# Patient Record
Sex: MALE | Race: WHITE | Employment: OTHER | URBAN - METROPOLITAN AREA
[De-identification: names, ages, dates, MRNs, and addresses within clinical notes are randomized per-mention and may not be internally consistent; named-entity substitution may affect disease eponyms.]

---

## 2017-08-14 ENCOUNTER — APPOINTMENT (OUTPATIENT)
Dept: GENERAL RADIOLOGY | Age: 82
DRG: 481 | End: 2017-08-14
Attending: NURSE PRACTITIONER
Payer: MEDICARE

## 2017-08-14 ENCOUNTER — APPOINTMENT (OUTPATIENT)
Dept: CT IMAGING | Age: 82
DRG: 481 | End: 2017-08-14
Attending: NURSE PRACTITIONER
Payer: MEDICARE

## 2017-08-14 ENCOUNTER — HOSPITAL ENCOUNTER (EMERGENCY)
Age: 82
Discharge: ANOTHER HOSPITAL | DRG: 481 | End: 2017-08-14
Attending: EMERGENCY MEDICINE
Payer: MEDICARE

## 2017-08-14 ENCOUNTER — ANESTHESIA EVENT (OUTPATIENT)
Dept: SURGERY | Age: 82
DRG: 481 | End: 2017-08-14
Payer: MEDICARE

## 2017-08-14 ENCOUNTER — HOSPITAL ENCOUNTER (INPATIENT)
Age: 82
LOS: 6 days | Discharge: NURSING, REHAB OR LONG-TERM CARE | DRG: 481 | End: 2017-08-20
Attending: INTERNAL MEDICINE | Admitting: HOSPITALIST
Payer: MEDICARE

## 2017-08-14 VITALS
WEIGHT: 160 LBS | BODY MASS INDEX: 25.11 KG/M2 | OXYGEN SATURATION: 95 % | TEMPERATURE: 97.9 F | HEIGHT: 67 IN | SYSTOLIC BLOOD PRESSURE: 123 MMHG | HEART RATE: 85 BPM | RESPIRATION RATE: 20 BRPM | DIASTOLIC BLOOD PRESSURE: 71 MMHG

## 2017-08-14 DIAGNOSIS — S72.144A CLOSED NONDISPLACED INTERTROCHANTERIC FRACTURE OF RIGHT FEMUR, INITIAL ENCOUNTER (HCC): ICD-10-CM

## 2017-08-14 DIAGNOSIS — S42.001A CLOSED NONDISPLACED FRACTURE OF RIGHT CLAVICLE, UNSPECIFIED PART OF CLAVICLE, INITIAL ENCOUNTER: ICD-10-CM
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
## 2017-08-14 PROBLEM — T14.8XXA FRACTURE: Status: ACTIVE | Noted: 2017-08-14

## 2017-08-14 PROBLEM — S72.309A FRACTURE CLOSED, FEMUR, SHAFT (HCC): Status: ACTIVE | Noted: 2017-08-14

## 2017-08-14 LAB
ALBUMIN SERPL BCP-MCNC: 3.2 G/DL (ref 3.2–4.6)
ALBUMIN/GLOB SERPL: 0.9 {RATIO} (ref 1.2–3.5)
ALP SERPL-CCNC: 97 U/L (ref 50–136)
ALT SERPL-CCNC: 14 U/L (ref 12–65)
ANION GAP BLD CALC-SCNC: 10 MMOL/L (ref 7–16)
AST SERPL W P-5'-P-CCNC: 20 U/L (ref 15–37)
ATRIAL RATE: 82 BPM
BACTERIA SPEC CULT: NORMAL
BASOPHILS # BLD AUTO: 0 K/UL (ref 0–0.2)
BASOPHILS # BLD: 0 % (ref 0–2)
BILIRUB SERPL-MCNC: 2.2 MG/DL (ref 0.2–1.1)
BUN SERPL-MCNC: 33 MG/DL (ref 8–23)
CALCIUM SERPL-MCNC: 9.2 MG/DL (ref 8.3–10.4)
CALCULATED P AXIS, ECG09: 67 DEGREES
CALCULATED R AXIS, ECG10: 34 DEGREES
CALCULATED T AXIS, ECG11: 90 DEGREES
CHLORIDE SERPL-SCNC: 103 MMOL/L (ref 98–107)
CK SERPL-CCNC: 24 U/L (ref 21–215)
CO2 SERPL-SCNC: 25 MMOL/L (ref 21–32)
CREAT SERPL-MCNC: 1.26 MG/DL (ref 0.8–1.5)
DIAGNOSIS, 93000: NORMAL
DIFFERENTIAL METHOD BLD: ABNORMAL
EOSINOPHIL # BLD: 0.1 K/UL (ref 0–0.8)
EOSINOPHIL NFR BLD: 4 % (ref 0.5–7.8)
ERYTHROCYTE [DISTWIDTH] IN BLOOD BY AUTOMATED COUNT: 15.1 % (ref 11.9–14.6)
GLOBULIN SER CALC-MCNC: 3.5 G/DL (ref 2.3–3.5)
GLUCOSE BLD STRIP.AUTO-MCNC: 147 MG/DL (ref 65–100)
GLUCOSE BLD STRIP.AUTO-MCNC: 189 MG/DL (ref 65–100)
GLUCOSE SERPL-MCNC: 137 MG/DL (ref 65–100)
HCT VFR BLD AUTO: 26.9 % (ref 41.1–50.3)
HGB BLD-MCNC: 9.1 G/DL (ref 13.6–17.2)
IMM GRANULOCYTES # BLD: 0 K/UL (ref 0–0.5)
IMM GRANULOCYTES NFR BLD AUTO: 0.3 % (ref 0–5)
LYMPHOCYTES # BLD AUTO: 14 % (ref 13–44)
LYMPHOCYTES # BLD: 0.5 K/UL (ref 0.5–4.6)
MCH RBC QN AUTO: 29.9 PG (ref 26.1–32.9)
MCHC RBC AUTO-ENTMCNC: 33.8 G/DL (ref 31.4–35)
MCV RBC AUTO: 88.5 FL (ref 79.6–97.8)
MONOCYTES # BLD: 0.3 K/UL (ref 0.1–1.3)
MONOCYTES NFR BLD AUTO: 7 % (ref 4–12)
NEUTS SEG # BLD: 2.7 K/UL (ref 1.7–8.2)
NEUTS SEG NFR BLD AUTO: 75 % (ref 43–78)
P-R INTERVAL, ECG05: 208 MS
PLATELET # BLD AUTO: 55 K/UL (ref 150–450)
PMV BLD AUTO: 11.7 FL (ref 10.8–14.1)
POTASSIUM SERPL-SCNC: 4.9 MMOL/L (ref 3.5–5.1)
PROT SERPL-MCNC: 6.7 G/DL (ref 6.3–8.2)
Q-T INTERVAL, ECG07: 426 MS
QRS DURATION, ECG06: 156 MS
QTC CALCULATION (BEZET), ECG08: 497 MS
RBC # BLD AUTO: 3.04 M/UL (ref 4.23–5.67)
SERVICE CMNT-IMP: NORMAL
SODIUM SERPL-SCNC: 138 MMOL/L (ref 136–145)
VENTRICULAR RATE, ECG03: 82 BPM
WBC # BLD AUTO: 3.6 K/UL (ref 4.3–11.1)

## 2017-08-14 PROCEDURE — 74011250636 HC RX REV CODE- 250/636: Performed by: NURSE PRACTITIONER

## 2017-08-14 PROCEDURE — 86920 COMPATIBILITY TEST SPIN: CPT | Performed by: HOSPITALIST

## 2017-08-14 PROCEDURE — 74011250636 HC RX REV CODE- 250/636: Performed by: HOSPITALIST

## 2017-08-14 PROCEDURE — 74011250637 HC RX REV CODE- 250/637: Performed by: HOSPITALIST

## 2017-08-14 PROCEDURE — 74011000258 HC RX REV CODE- 258: Performed by: HOSPITALIST

## 2017-08-14 PROCEDURE — C9113 INJ PANTOPRAZOLE SODIUM, VIA: HCPCS | Performed by: HOSPITALIST

## 2017-08-14 PROCEDURE — 65270000029 HC RM PRIVATE

## 2017-08-14 PROCEDURE — 86580 TB INTRADERMAL TEST: CPT | Performed by: HOSPITALIST

## 2017-08-14 PROCEDURE — 87641 MR-STAPH DNA AMP PROBE: CPT | Performed by: ORTHOPAEDIC SURGERY

## 2017-08-14 PROCEDURE — 86900 BLOOD TYPING SEROLOGIC ABO: CPT | Performed by: HOSPITALIST

## 2017-08-14 PROCEDURE — 74011000302 HC RX REV CODE- 302: Performed by: HOSPITALIST

## 2017-08-14 PROCEDURE — 82962 GLUCOSE BLOOD TEST: CPT

## 2017-08-14 PROCEDURE — 74011250637 HC RX REV CODE- 250/637: Performed by: ORTHOPAEDIC SURGERY

## 2017-08-14 PROCEDURE — 92610 EVALUATE SWALLOWING FUNCTION: CPT

## 2017-08-14 RX ORDER — INSULIN LISPRO 100 [IU]/ML
INJECTION, SOLUTION INTRAVENOUS; SUBCUTANEOUS
Status: DISCONTINUED | OUTPATIENT
Start: 2017-08-14 | End: 2017-08-20 | Stop reason: HOSPADM

## 2017-08-14 RX ORDER — LANOLIN ALCOHOL/MO/W.PET/CERES
1000 CREAM (GRAM) TOPICAL DAILY
Status: DISCONTINUED | OUTPATIENT
Start: 2017-08-15 | End: 2017-08-20 | Stop reason: HOSPADM

## 2017-08-14 RX ORDER — CALCIUM CARBONATE/VITAMIN D3 250-3.125
1 TABLET ORAL 2 TIMES DAILY WITH MEALS
Status: DISCONTINUED | OUTPATIENT
Start: 2017-08-14 | End: 2017-08-18 | Stop reason: SDUPTHER

## 2017-08-14 RX ORDER — LANOLIN ALCOHOL/MO/W.PET/CERES
1 CREAM (GRAM) TOPICAL 2 TIMES DAILY WITH MEALS
Status: CANCELLED | OUTPATIENT
Start: 2017-08-14

## 2017-08-14 RX ORDER — ALLOPURINOL 100 MG/1
100 TABLET ORAL DAILY
Status: DISCONTINUED | OUTPATIENT
Start: 2017-08-15 | End: 2017-08-20 | Stop reason: HOSPADM

## 2017-08-14 RX ORDER — ALPRAZOLAM 0.5 MG/1
2 TABLET ORAL 2 TIMES DAILY
Status: DISCONTINUED | OUTPATIENT
Start: 2017-08-14 | End: 2017-08-20 | Stop reason: HOSPADM

## 2017-08-14 RX ORDER — PANTOPRAZOLE SODIUM 40 MG/1
40 TABLET, DELAYED RELEASE ORAL
Status: CANCELLED | OUTPATIENT
Start: 2017-08-15

## 2017-08-14 RX ORDER — LANOLIN ALCOHOL/MO/W.PET/CERES
100 CREAM (GRAM) TOPICAL DAILY
Status: CANCELLED | OUTPATIENT
Start: 2017-08-15

## 2017-08-14 RX ORDER — OXYCODONE HYDROCHLORIDE 5 MG/1
5 TABLET ORAL
Status: DISCONTINUED | OUTPATIENT
Start: 2017-08-14 | End: 2017-08-20 | Stop reason: HOSPADM

## 2017-08-14 RX ORDER — TRAMADOL HYDROCHLORIDE 50 MG/1
50 TABLET ORAL
Status: DISCONTINUED | OUTPATIENT
Start: 2017-08-14 | End: 2017-08-20 | Stop reason: HOSPADM

## 2017-08-14 RX ORDER — SODIUM CHLORIDE, SODIUM LACTATE, POTASSIUM CHLORIDE, CALCIUM CHLORIDE 600; 310; 30; 20 MG/100ML; MG/100ML; MG/100ML; MG/100ML
75 INJECTION, SOLUTION INTRAVENOUS CONTINUOUS
Status: DISCONTINUED | OUTPATIENT
Start: 2017-08-14 | End: 2017-08-18

## 2017-08-14 RX ORDER — LANOLIN ALCOHOL/MO/W.PET/CERES
100 CREAM (GRAM) TOPICAL DAILY
Status: DISCONTINUED | OUTPATIENT
Start: 2017-08-15 | End: 2017-08-20 | Stop reason: HOSPADM

## 2017-08-14 RX ORDER — PANTOPRAZOLE SODIUM 40 MG/10ML
40 INJECTION, POWDER, LYOPHILIZED, FOR SOLUTION INTRAVENOUS EVERY 12 HOURS
Status: DISCONTINUED | OUTPATIENT
Start: 2017-08-14 | End: 2017-08-20 | Stop reason: HOSPADM

## 2017-08-14 RX ORDER — DEXTROSE MONOHYDRATE AND SODIUM CHLORIDE 5; .9 G/100ML; G/100ML
75 INJECTION, SOLUTION INTRAVENOUS CONTINUOUS
Status: DISCONTINUED | OUTPATIENT
Start: 2017-08-14 | End: 2017-08-20 | Stop reason: HOSPADM

## 2017-08-14 RX ORDER — HYDROMORPHONE HYDROCHLORIDE 1 MG/ML
0.5 INJECTION, SOLUTION INTRAMUSCULAR; INTRAVENOUS; SUBCUTANEOUS
Status: DISCONTINUED | OUTPATIENT
Start: 2017-08-14 | End: 2017-08-15

## 2017-08-14 RX ORDER — ALPRAZOLAM 0.5 MG/1
2 TABLET ORAL 2 TIMES DAILY
Status: CANCELLED | OUTPATIENT
Start: 2017-08-14

## 2017-08-14 RX ORDER — CHROMIUM PICOLINATE 200 MCG
1 TABLET ORAL DAILY
Status: CANCELLED | OUTPATIENT
Start: 2017-08-15

## 2017-08-14 RX ORDER — ALLOPURINOL 100 MG/1
100 TABLET ORAL DAILY
Status: CANCELLED | OUTPATIENT
Start: 2017-08-15

## 2017-08-14 RX ORDER — CEFAZOLIN SODIUM IN 0.9 % NACL 2 G/50 ML
2 INTRAVENOUS SOLUTION, PIGGYBACK (ML) INTRAVENOUS
Status: DISCONTINUED | OUTPATIENT
Start: 2017-08-15 | End: 2017-08-15

## 2017-08-14 RX ORDER — MORPHINE SULFATE 2 MG/ML
2 INJECTION, SOLUTION INTRAMUSCULAR; INTRAVENOUS
Status: DISCONTINUED | OUTPATIENT
Start: 2017-08-14 | End: 2017-08-14 | Stop reason: ALTCHOICE

## 2017-08-14 RX ORDER — LACTULOSE 10 G/15ML
10 SOLUTION ORAL; RECTAL DAILY
Status: CANCELLED | OUTPATIENT
Start: 2017-08-15

## 2017-08-14 RX ORDER — LANOLIN ALCOHOL/MO/W.PET/CERES
1 CREAM (GRAM) TOPICAL 2 TIMES DAILY WITH MEALS
Status: DISCONTINUED | OUTPATIENT
Start: 2017-08-14 | End: 2017-08-20 | Stop reason: HOSPADM

## 2017-08-14 RX ORDER — LANOLIN ALCOHOL/MO/W.PET/CERES
1000 CREAM (GRAM) TOPICAL DAILY
Status: CANCELLED | OUTPATIENT
Start: 2017-08-15

## 2017-08-14 RX ADMIN — SODIUM CHLORIDE, SODIUM LACTATE, POTASSIUM CHLORIDE, AND CALCIUM CHLORIDE 75 ML/HR: 600; 310; 30; 20 INJECTION, SOLUTION INTRAVENOUS at 21:51

## 2017-08-14 RX ADMIN — PANTOPRAZOLE SODIUM 40 MG: 40 INJECTION, POWDER, FOR SOLUTION INTRAVENOUS at 21:12

## 2017-08-14 RX ADMIN — TRAMADOL HYDROCHLORIDE 50 MG: 50 TABLET, FILM COATED ORAL at 17:21

## 2017-08-14 RX ADMIN — ALPRAZOLAM 2 MG: 0.5 TABLET ORAL at 17:21

## 2017-08-14 RX ADMIN — CALCIUM CARBONATE-CHOLECALCIFEROL TAB 250 MG-125 UNIT 1 TABLET: 250-125 TAB at 17:21

## 2017-08-14 RX ADMIN — TUBERCULIN PURIFIED PROTEIN DERIVATIVE 5 UNITS: 5 INJECTION, SOLUTION INTRADERMAL at 15:25

## 2017-08-14 RX ADMIN — FERROUS SULFATE TAB 325 MG (65 MG ELEMENTAL FE) 325 MG: 325 (65 FE) TAB at 17:21

## 2017-08-14 RX ADMIN — DEXTROSE MONOHYDRATE AND SODIUM CHLORIDE 75 ML/HR: 5; .9 INJECTION, SOLUTION INTRAVENOUS at 15:29

## 2017-08-14 NOTE — PROGRESS NOTES
LTG: Patient will tolerate least restrictive diet without overt signs or symptoms of airway compromise. STG: Patient will tolerate mechanical soft with chopped meats and honey thick liquids without overt signs or symptoms of airway compromise. STG: Patient will participate in modified barium swallow study as clinically indicated. Speech language pathology: bedside swallow note: Initial Assessment    NAME/AGE/GENDER: Vern Gentile is a 80 y.o. male  DATE: 8/14/2017  PRIMARY DIAGNOSIS: Intertrochanteric fracture of right femur, closed, initial encounter ([facility] [S72.141A]  Procedure(s) (LRB):  RIGHT FEMUR INSERTION INTRA MEDULLARY NAIL / ROOM 732 (Right)    ICD-10: Treatment Diagnosis: R13.12 Oropharyngeal Dysphagia. INTERDISCIPLINARY COLLABORATION: Registered Nurse  PRECAUTIONS/ALLERGIES: Review of patient's allergies indicates no known allergies. ASSESSMENT:Based on the objective data described below, Mr. Jessica Hope presents with moderate-severe oropharyngeal dysphagia. Patient with history of dysphagia and seen for modified barium swallow study in 08/2015 with recommendations of MECHANICAL SOFT with PUDDING thick liquids. Patient's wife reports vague recollection of modified barium swallow study, but is not able to recall diet recommendations. She reports using \"one little scoop of powder in a big glass of water\". Clinician requested wife thicken a cup of water to demonstrate consistency patient is consuming at home. Liquids prepared by wife were shy of nectar thick consistency. Wife reports frequent \"strangling\" when drinking. Upon arrival, Clinician also observed wife presenting patient with water via straw sip while laying supine, resulting in immediate coughing. Clinician presented patient with nectar thick liquids via cup, honey thick via cup and straw, puree, and mixed consistency. Patient demonstrated appropriate labial receipt of bolus.  Increased oral prep time with puree and mixed consistency with piecemeal swallow of bolus. Multiple swallow palpated with mixed consistency. Oral holding observed with nectar and honey thick liquid trials. Immediate cough with nectar via cup and honey with straw. Inconsistent delayed throat clear with honey via cup. Recommend MECHANICAL SOFT DIET with chopped meats and vegetables. HONEY thick liquids via cup. Medication crushed in puree. Patient needs to be sitting upright in bed when consuming all po. He would also benefit from supervision secondary to impulsivity- reported by wife, but not observed during evaluation. Clinician reviewed all recommendations with patient's wife; however, she verbalized minimal comprehension of information. She posed multiple questions regarding liquid consistency and was noted to perseverate on consistency that he has been consuming at home. Clinician assured wife that all liquids provided by this facility will be pre-thickened to proper consistency. Also reviewed thickening instructions, which are located on back of thickener packet. Patient will likely benefit from modified barium swallow study during inpatient stay to further evaluate swallow function. Per report, surgery is scheduled for tomorrow, and will not be able to participate in modified barium swallow study until after surgical procedure. Patient will benefit from skilled intervention to address the below impairments. ?????? ? ? This section established at most recent assessment??????????  PROBLEM LIST (Impairments causing functional limitations):  1. Oropharyngeal dysphagia  REHABILITATION POTENTIAL FOR STATED GOALS: Fair  PLAN OF CARE:   Patient will benefit from skilled intervention to address the following impairments.   RECOMMENDATIONS AND PLANNED INTERVENTIONS (Benefits and precautions of therapy have been discussed with the patient.):  · PO:  Mechanical soft with chopped meat and vegetables  · Liquids:  honey  MEDICATIONS:  · Crushed in puree  COMPENSATORY STRATEGIES/MODIFICATIONS INCLUDING:  · Cup/sip  · Small sips and bites  OTHER RECOMMENDATIONS (including follow up treatment recommendations): · Family training/education  · Patient education  RECOMMENDED DIET MODIFICATIONS DISCUSSED WITH:  · Nursing  · Family  · Patient  FREQUENCY/DURATION: Continue to follow patient 3 times a week for duration of hospital stay to address above goals. RECOMMENDED REHABILITATION/EQUIPMENT: (at time of discharge pending progress):   Continue Skilled Therapy. SUBJECTIVE:   Cooperative, pleasant  History of Present Injury/Illness: Mr. Nyla Wolff  has a past medical history of Alcohol abuse; AVM (arteriovenous malformation) of colon; Diabetes ([facility] Hypertension; Liver disease; Macular degeneration; Other ill-defined conditions; Portal hypertension ([address] Psychiatric disorder; PUD (peptic ulcer disease); Thrombocytopenia ([address] and Varices, esophageal ([facility] .  He also  has a past surgical history that includes orthopaedic (1999) and other surgical.   Present Symptoms: Coughing with liquids; dysphagia   Pain Intensity 1: 0  Current Medications:   No current facility-administered medications on file prior to encounter. Current Outpatient Prescriptions on File Prior to Encounter   Medication Sig Dispense Refill    ferrous sulfate 325 mg (65 mg iron) cpER Take  by mouth.  levofloxacin (LEVAQUIN) 750 mg tablet Take 1 Tab by mouth daily. 10 Tab 0    lactulose (CHRONULAC) 10 gram/15 mL solution Take 10 g by mouth daily.  glipiZIDE (GLUCOTROL) 5 mg tablet Take 5 mg by mouth Daily (before breakfast).  cyanocobalamin (VITAMIN B-12) 1,000 mcg tablet Take 1,000 mcg by mouth daily.  Calcium-Cholecalciferol, D3, (CALCIUM 600 WITH VITAMIN D3) 600 mg(1,500mg) -400 unit cap Take 1 Cap by mouth daily. Indications: HYPOCALCEMIA      OTHER,NON-FORMULARY, Preser-vision takes BID      ALPRAZolam (XANAX) 2 mg tablet Take 2 mg by mouth two (2) times a day.         allopurinol (ZYLOPRIM) 100 mg tablet Take 100 mg by mouth daily.  multivitamin (ONE A DAY) tablet Take 1 Tab by mouth daily.  metFORMIN (GLUCOPHAGE) 500 mg tablet Take 500 mg by mouth two (2) times daily (with meals).  thiamine (B-1) 100 mg tablet Take 100 mg by mouth daily.  omeprazole (PRILOSEC) 40 mg capsule Take 40 mg by mouth daily. Current Dietary Status:  Per wife, mechanical soft with nectar thick liquids      Social History/Home Situation:      Home Environment: Private residence  One/Two Story Residence: One story  Living Alone: No  Support Systems: Spouse/Significant Other/Partner  Patient Expects to be Discharged to[de-identified] Unknown  Current DME Used/Available at Home: Milton Hanly, straight, Walker, Wheelchair, Shower chair  OBJECTIVE:   Respiratory Status:  Room air     CXR Results:Minimal scarring/atelectasis in the left lung base  MRI/CT Results:No acute CT findings the brain. Chronic changes as described above  Oral Motor Structure/Speech:  Oral-Motor Structure/Motor Speech  Labial: Decreased rate  Oral Hygiene: Adequate  Lingual: Decreased rate    Cognitive and Communication Status:  Neurologic State: Alert;Confused  Orientation Level: Oriented to person;Disoriented to situation;Disoriented to place; Disoriented to time  Cognition: Impulsive;Decreased attention/concentration;Decreased command following; Impaired decision making  Perception: Appears intact  Perseveration: No perseveration noted  Safety/Judgement: Decreased awareness of environment;Decreased awareness of need for assistance;Decreased awareness of need for safety;Decreased insight into deficits    BEDSIDE SWALLOW EVALUATION  Oral Assessment:  Oral Assessment  Labial: Decreased rate  Oral Hygiene: Adequate  Lingual: Decreased rate  P.O. Trials:  Patient Position: Upright in bed    The patient was given tsp-small bite amounts of the following:   Consistency Presented: Nectar thick liquid;Mixed consistency; Honey thick liquid  How Presented: Self-fed/presented;Spoon;Straw;Cup/sip    ORAL PHASE:  Bolus Acceptance: No impairment  Bolus Formation/Control: Impaired  Propulsion: Delayed (# of seconds)  Type of Impairment: Delayed;Mastication;Piecemeal  Oral Residue: 10-50% of bolus    PHARYNGEAL PHASE:  Initiation of Swallow: Delayed (# of seconds)  Laryngeal Elevation: Functional  Aspiration Signs/Symptoms: Clear throat;Weak cough  Vocal Quality: Constriction; Low volume  Cues for Modifications: Moderate  Effective Modifications: Cup/sip     Pharyngeal Phase Characteristics: Multiple swallows    OTHER OBSERVATIONS:  Rate/bite size: Impaired   Endurance:  Impaired   Comments: Tool Used: Dysphagia Outcome and Severity Scale (GUSTAVO)    Score Comments   Normal Diet  [] 7 With no strategies or extra time needed   Functional Swallow  [] 6 May have mild oral or pharyngeal delay       Mild Dysphagia    [] 5 Which may require one diet consistency restricted (those who demonstrate penetration which is entirely cleared on MBS would be included)   Mild-Moderate Dysphagia  [] 4 With 1-2 diet consistencies restricted       Moderate Dysphagia  [x] 3 With 2 or more diet consistencies restricted       Moderately Severe Dysphagia  [] 2 With partial PO strategies (trials with ST only)       Severe Dysphagia  [] 1 With inability to tolerate any PO safely          Score:  Initial: 7 Most Recent: X (Date: -- )   Interpretation of Tool: The Dysphagia Outcome and Severity Scale (GUSTAVO) is a simple, easy-to-use, 7-point scale developed to systematically rate the functional severity of dysphagia based on objective assessment and make recommendations for diet level, independence level, and type of nutrition. Score 7 6 5 4 3 2 1   Modifier CH CI CJ CK CL CM CN   ?  Swallowing:     - CURRENT STATUS: CL - 60%-79% impaired, limited or restricted    - GOAL STATUS:  CK - 40%-59% impaired, limited or restricted    - D/C STATUS:  ---------------To be determined---------------  [place] / [place] / Product Type: Medicare /     TREATMENT:    (In addition to Assessment/Re-Assessment sessions the following treatments were rendered)  Assessment/Reassessment only, no treatment provided today  MODALITIES:                                                                    ORAL MOTOR  EXERCISES:                                                                                                                                                                      LARYNGEAL / PHARYNGEAL EXERCISES:                                                                                                                                     __________________________________________________________________________________________________  Safety:   After treatment position/precautions:  · RN notified  · Family at bedside  · Upright in Bed  Progression/Medical Necessity:   · Patient is expected to demonstrate progress in swallow function to decrease aspiration risk. Compliance with Program/Exercises: Will assess as treatment progresses. Reason for Continuation of Services/Other Comments:  · Patient continues to require skilled intervention due to Dysphagia. Recommendations/Intent for next treatment session: \"Treatment next visit will focus on diet tolerance. \".     Total Treatment Duration:  Time In: 1557  Time Out: 1630    DIONISIO Infante, CCC-SLP, CBIS

## 2017-08-14 NOTE — PROGRESS NOTES
Spoke with patient's wife ([phone], cell [phone]), she will be here at noon-1230 tomorrow for Preop.

## 2017-08-14 NOTE — ROUTINE PROCESS
TRANSFER - OUT REPORT:    Verbal report given to Jamison Magallanes RN(name) on Huber Chambers  being transferred to [facility] room #732(unit) for routine progression of care       Report consisted of patients Situation, Background, Assessment and   Recommendations(SBAR). Information from the following report(s) ED Summary was reviewed with the receiving nurse. Lines:   Peripheral IV 08/14/17 Left Forearm (Active)   Site Assessment Clean, dry, & intact 8/14/2017 12:37 PM   Phlebitis Assessment 0 8/14/2017 12:37 PM   Infiltration Assessment 0 8/14/2017 12:37 PM   Dressing Status Clean, dry, & intact 8/14/2017 12:37 PM        Opportunity for questions and clarification was provided.       Patient transported with:  [facility]

## 2017-08-14 NOTE — ED TRIAGE NOTES
Patient lives at home with his wife. Patient states he fell last Thursday, 08/10/17 and injured his right side. Patient states he is sore from \"my right shoulder to my big toe\". Patient with yellowish purple bruising to right shoulder, right upper chest, right upper back. Patient with decreased rom in [place] [address] [place].

## 2017-08-14 NOTE — ED NOTES
[facility] here to transport patient to [facility] room #494 for Ortho services.

## 2017-08-14 NOTE — PROGRESS NOTES
TRANSFER - IN REPORT:    Verbal report received from Maria Esther WILLINGHAM on Lorenzo Kendall  being received from [facility] ED for routine progression of care      Report consisted of patients Situation, Background, Assessment and   Recommendations(SBAR). Information from the following report(s) SBAR, Kardex, OR Summary, Intake/Output, MAR and Recent Results was reviewed with the receiving nurse. Opportunity for questions and clarification was provided. Assessment to be completed upon patients arrival to unit and care assumed at that time.

## 2017-08-14 NOTE — H&P
HOSPITALIST H&P/CONSULT  NAME:  Anup Crum   Age:  80 y.o.  :   7/15/1933   MRN:   [id number]  PCP: Bhupendra Grey MD  Consulting MD:  Treatment Team: Attending Provider: Jimbo Castillo DO  HPI:88 yo  Male with past medical hx of  Parkinson and mild cognitive difficulty he is also  Legally blind and  Death he resides with his  Wife who provided  Most of his  History. They are an elderly couple and she has been  Taking care of him thus far. She reports that  He  Picked up his cane and was walking and she heard a thump and  When she  Turned around he was retirement between the doorway and  The  [address]. The pt she states has  Been  Falling a non stop and  Has gotten  Worse over the past   2 weeks. She is unable to care for him at this time. The patient denies any chest pain  And is unsure about  Shortness of breath. Imaging  Studies obtained in the ED shows  Multiple stigmata of fractures including   clavicle fractures and most recently a right  Hip  Fracture. Per wife she has been weaning  Him off  His xanax. 10 point ROS done and is negative except as noted in HPI. Past Medical History:   Diagnosis Date    Alcohol abuse     AVM (arteriovenous malformation) of colon     Diabetes ([facility]     Hypertension     Liver disease     Laennec's cirrhosis    Macular degeneration     Other ill-defined conditions     gout    Portal hypertension (HCC)     Psychiatric disorder     anxiety/depression    PUD (peptic ulcer disease)     Thrombocytopenia (HCC)     Varices, esophageal (HCC)       Past Surgical History:   Procedure Laterality Date    HX ORTHOPAEDIC      repair fx right humeral neck with titanium    HX OTHER SURGICAL      sx for undescended testicle      Cannot display prior to admission medications because the patient has not been admitted in this contact. Home meds reconciled.   No Known Allergies   Social History   Substance Use Topics    Smoking status: Never Smoker    Smokeless tobacco: Never Used    Alcohol use Yes      Comment: quit  2010-patients wife states patient was alcoholic      Family History   Problem Relation Age of Onset    Cancer Father      \"bone cancer\"        There is no immunization history on file for this patient. Objective: There were no vitals taken for this visit. Temp (24hrs), Av.9 °F (36.6 °C), Min:97.9 °F (36.6 °C), Max:97.9 °F (36.6 °C)       Physical Exam:  General:    Alert, cooperative, no distress   Head:   NCAT. No obvious deformity  Nose:  Nares normal. No drainage  Lungs:   CTABL. No wheezing/rhonchi/rales  Heart:   RRR. No m/r/g. Abdomen:   S/nt/nd. Bowel sounds normal.   Extremities:  erythema around the shoulder and  Right hip pain and tenderness  Skin:     No rashes or lesions. Not Jaundiced  Neurologic: Moves all extremities. no gross focal deficits      Data Review:   Recent Results (from the past 24 hour(s))   EKG, 12 LEAD, INITIAL    Collection Time: 17 12:12 PM   Result Value Ref Range    Ventricular Rate 82 BPM    Atrial Rate 82 BPM    P-R Interval 208 ms    QRS Duration 156 ms    Q-T Interval 426 ms    QTC Calculation (Bezet) 497 ms    Calculated P Axis 67 degrees    Calculated R Axis 34 degrees    Calculated T Axis 90 degrees    Diagnosis       !! AGE AND GENDER SPECIFIC ECG ANALYSIS !!   Normal sinus rhythm  Left bundle branch block  Abnormal ECG  When compared with ECG of 18-SEP-2013 17:03,  MI interval has decreased     CBC WITH AUTOMATED DIFF    Collection Time: 17 12:30 PM   Result Value Ref Range    WBC 3.6 (L) 4.3 - 11.1 K/uL    RBC 3.04 (L) 4.23 - 5.67 M/uL    HGB 9.1 (L) 13.6 - 17.2 g/dL    HCT 26.9 (L) 41.1 - 50.3 %    MCV 88.5 79.6 - 97.8 FL    MCH 29.9 26.1 - 32.9 PG    MCHC 33.8 31.4 - 35.0 g/dL    RDW 15.1 (H) 11.9 - 14.6 %    PLATELET 55 (L) 428 - 450 K/uL    MPV 11.7 10.8 - 14.1 FL    DF AUTOMATED      NEUTROPHILS 75 43 - 78 %    LYMPHOCYTES 14 13 - 44 %    MONOCYTES 7 4.0 - 12.0 % EOSINOPHILS 4 0.5 - 7.8 %    BASOPHILS 0 0.0 - 2.0 %    IMMATURE GRANULOCYTES 0.3 0.0 - 5.0 %    ABS. NEUTROPHILS 2.7 1.7 - 8.2 K/UL    ABS. LYMPHOCYTES 0.5 0.5 - 4.6 K/UL    ABS. MONOCYTES 0.3 0.1 - 1.3 K/UL    ABS. EOSINOPHILS 0.1 0.0 - 0.8 K/UL    ABS. BASOPHILS 0.0 0.0 - 0.2 K/UL    ABS. IMM. GRANS. 0.0 0.0 - 0.5 K/UL   METABOLIC PANEL, COMPREHENSIVE    Collection Time: 08/14/17 12:30 PM   Result Value Ref Range    Sodium 138 136 - 145 mmol/L    Potassium 4.9 3.5 - 5.1 mmol/L    Chloride 103 98 - 107 mmol/L    CO2 25 21 - 32 mmol/L    Anion gap 10 7 - 16 mmol/L    Glucose 137 (H) 65 - 100 mg/dL    BUN 33 (H) 8 - 23 MG/DL    Creatinine 1.26 0.8 - 1.5 MG/DL    GFR est AA >60 >60 ml/min/1.73m2    GFR est non-AA 58 (L) >60 ml/min/1.73m2    Calcium 9.2 8.3 - 10.4 MG/DL    Bilirubin, total 2.2 (H) 0.2 - 1.1 MG/DL    ALT (SGPT) 14 12 - 65 U/L    AST (SGOT) 20 15 - 37 U/L    Alk. phosphatase 97 50 - 136 U/L    Protein, total 6.7 6.3 - 8.2 g/dL    Albumin 3.2 3.2 - 4.6 g/dL    Globulin 3.5 2.3 - 3.5 g/dL    A-G Ratio 0.9 (L) 1.2 - 3.5     CK    Collection Time: 08/14/17 12:30 PM   Result Value Ref Range    CK 24 21 - 215 U/L     Imaging /Procedures /Studies:  I personally reviewed all labs, imaging, and other studies this admission:  CXR Results  (Last 48 hours)               08/14/17 1304  XR CHEST PORT Final result    Impression:  IMPRESSION: Minimal scarring/atelectasis in the left lung base           Narrative:  Chest X-ray       INDICATION:   Recent fall, right-sided chest pain       A portable AP view of the chest was obtained. FINDINGS: There is minimal scarring/atelectasis in the left lung base. The   right lung is clear. There is no pneumothorax or effusion. The heart size is   normal.  The bony thorax is intact. CT Results  (Last 48 hours)               08/14/17 1316  CT SPINE CERV WO CONT Final result    Impression:  IMPRESSION:       1. No acute fracture in the cervical spine. 2. Mildly displaced proximal right clavicular fracture. 2. Nonspecific asymmetric fat stranding in the posterior triangle of the neck on   the right. Narrative:   CT SPINE CERV WO CONT    8/14/2017 1:16 PM        CLINICAL INFORMATION:  80year-old with neck pain status post fall 5 days ago. Comparison: None available       Procedure: Emergent CT of the cervical spine was performed in the axial plane. Contrast not administered. Sagittal and coronal reconstructed images performed   and reviewed. Bone and soft tissue windows reviewed. Radiation dose reduction techniques were used for this study. Our CT scanners   used one or all of the following: Automated exposure control, adjustment of the   MA and/or kV according to patient size, iterative reconstruction. Findings: The cervical spine is visualized through the inferior aspect of T1. Alignment is anatomic. No prevertebral soft tissue swelling. The craniocervical junction is preserved, and the dens is intact. Cervical   vertebral body heights are preserved without acute fracture. Multilevel   intervertebral disc space height loss is present. Degenerative changes result in mild right neuroforaminal stenosis at C2-C3,   right greater than left neuroforaminal stenosis at C3-C4, moderate to severe   bilateral neuroforaminal stenosis at C4-C5, C5-C6 and to a lesser degree C6-C7. Partially visualized proximal right clavicular fracture. Spinal canal is patent. Lung apices are clear. Incidental vascular calcifications. There is incidental   asymmetric fat stranding in the posterior triangle of the neck on the right. 08/14/17 1313  CT HEAD WO CONT Final result    Impression:  IMPRESSION:  No acute CT findings the brain. Chronic changes as described above. Narrative:  CT BRAIN WITHOUT CONTRAST 8/14/2017       HISTORY:  Patient fell several days earlier.        COMPARISON:  CT brain 11/14/2013 TECHNIQUE:  Axial scans without contrast.  Radiation dose reduction techniques   were used for this study: All CT scans performed at this facility use one or   all of the following: Automated exposure control, adjustment of the mA and/or   kVp according to patient's size, iterative reconstruction. FINDINGS:  There is diffuse, advanced cortical volume loss compatible with age. Ventricles are appropriate size. There is white matter low-attenuation   consistent with chronic microvascular ischemia. Negative for acute intracranial hemorrhage, focal white matter edema, mass, or   mass effect. No definite CT evidence of acute major vascular territory infarct. Bony calvarium intact. Mastoid air cells and paranasal sinuses clear where   visualized. Assessment and Plan: Active Hospital Problems    Diagnosis Date Noted    Fracture closed, femur, shaft ([facility] 08/14/2017       There are no active hospital problems to display for this patient. PLAN  81 yo male blind and partially  Deaf presenting with  Recurrent falls.     Right hip fracture secondary to mechanical falls  Ortho plan to  Have pt transferred  [facility]  Further management per ortho  Pain medication   Morphine  PRN     Chronic thrombocytopenia   plts 55  Possible from  Liver disease   Avoid heparin products and aspirin     monitor for intra articular bleed  Type and screen   Avoid antiplts and  heaprin products at this  [place] [facility]    Diabetes  Sliding scale    Hold  Oral  meds    Parkinsons/ Psychiatric disorder  Check UA     AVM  Monitor  HGB    Gout  Allopurinol    Falls  Recurrent  Pt may need placement   Will place PPD        FEN:  NPO  DVT ppx:  SCD  Code status:    Estimated LOS:    Risk assessment:    Plan of care discussed with: patient     Signed By: Lane Blue MD     August 14, 2017

## 2017-08-14 NOTE — PROGRESS NOTES
Skin assessment with Ciarra Valenzuela. Extensive bruising to RUE, R posterior upper back, R shoulder. 3 inch skin tear with scab to LFA. Bruising R hip.  1mm abrasions bilateral dorsal feet R>L. Skin otherwise intact.

## 2017-08-15 ENCOUNTER — APPOINTMENT (OUTPATIENT)
Dept: GENERAL RADIOLOGY | Age: 82
DRG: 481 | End: 2017-08-15
Attending: ORTHOPAEDIC SURGERY
Payer: MEDICARE

## 2017-08-15 ENCOUNTER — ANESTHESIA (OUTPATIENT)
Dept: SURGERY | Age: 82
DRG: 481 | End: 2017-08-15
Payer: MEDICARE

## 2017-08-15 LAB
APPEARANCE UR: ABNORMAL
APTT PPP: 39.2 SEC (ref 23.5–31.7)
BACTERIA URNS QL MICRO: 0 /HPF
BILIRUB UR QL: NEGATIVE
CALCIUM SERPL-MCNC: 8.1 MG/DL (ref 8.3–10.4)
CASTS URNS QL MICRO: 0 /LPF
COLOR UR: YELLOW
EPI CELLS #/AREA URNS HPF: 0 /HPF
GLUCOSE BLD STRIP.AUTO-MCNC: 134 MG/DL (ref 65–100)
GLUCOSE BLD STRIP.AUTO-MCNC: 136 MG/DL (ref 65–100)
GLUCOSE BLD STRIP.AUTO-MCNC: 152 MG/DL (ref 65–100)
GLUCOSE BLD STRIP.AUTO-MCNC: 163 MG/DL (ref 65–100)
GLUCOSE UR STRIP.AUTO-MCNC: NEGATIVE MG/DL
HGB UR QL STRIP: ABNORMAL
INR PPP: 1.2 (ref 0.9–1.2)
KETONES UR QL STRIP.AUTO: NEGATIVE MG/DL
LEUKOCYTE ESTERASE UR QL STRIP.AUTO: ABNORMAL
MM INDURATION POC: NORMAL MM (ref 0–5)
NITRITE UR QL STRIP.AUTO: NEGATIVE
PH UR STRIP: 5.5 [PH] (ref 5–9)
PPD POC: 0 NEGATIVE
PREALB SERPL-MCNC: 9.89 MG/DL (ref 18–35.7)
PROT UR STRIP-MCNC: NEGATIVE MG/DL
PROTHROMBIN TIME: 13.1 SEC (ref 9.6–12)
PTH-INTACT SERPL-MCNC: 18.8 PG/ML (ref 14–72)
RBC #/AREA URNS HPF: ABNORMAL /HPF
SP GR UR REFRACTOMETRY: 1.01 (ref 1–1.02)
UROBILINOGEN UR QL STRIP.AUTO: 1 EU/DL (ref 0.2–1)
WBC URNS QL MICRO: >100 /HPF

## 2017-08-15 PROCEDURE — 74011250636 HC RX REV CODE- 250/636: Performed by: NURSE PRACTITIONER

## 2017-08-15 PROCEDURE — C1769 GUIDE WIRE: HCPCS | Performed by: ORTHOPAEDIC SURGERY

## 2017-08-15 PROCEDURE — 77030008703 HC TU ET UNCUF COVD -A: Performed by: ANESTHESIOLOGY

## 2017-08-15 PROCEDURE — 82962 GLUCOSE BLOOD TEST: CPT

## 2017-08-15 PROCEDURE — 36415 COLL VENOUS BLD VENIPUNCTURE: CPT | Performed by: NURSE PRACTITIONER

## 2017-08-15 PROCEDURE — 74011250637 HC RX REV CODE- 250/637: Performed by: NURSE PRACTITIONER

## 2017-08-15 PROCEDURE — 76010000138 HC OR TIME 0.5 TO 1 HR: Performed by: ORTHOPAEDIC SURGERY

## 2017-08-15 PROCEDURE — 74011636637 HC RX REV CODE- 636/637: Performed by: HOSPITALIST

## 2017-08-15 PROCEDURE — 77030019940 HC BLNKT HYPOTHRM STRY -B: Performed by: ANESTHESIOLOGY

## 2017-08-15 PROCEDURE — 74011250636 HC RX REV CODE- 250/636

## 2017-08-15 PROCEDURE — 65270000029 HC RM PRIVATE

## 2017-08-15 PROCEDURE — 77030008477 HC STYL SATN SLP COVD -A: Performed by: ANESTHESIOLOGY

## 2017-08-15 PROCEDURE — 77030011640 HC PAD GRND REM COVD -A: Performed by: ORTHOPAEDIC SURGERY

## 2017-08-15 PROCEDURE — 74011000258 HC RX REV CODE- 258: Performed by: NURSE PRACTITIONER

## 2017-08-15 PROCEDURE — 73552 X-RAY EXAM OF FEMUR 2/>: CPT

## 2017-08-15 PROCEDURE — 0QS636Z REPOSITION RIGHT UPPER FEMUR WITH INTRAMEDULLARY INTERNAL FIXATION DEVICE, PERCUTANEOUS APPROACH: ICD-10-PCS | Performed by: ORTHOPAEDIC SURGERY

## 2017-08-15 PROCEDURE — 77030008467 HC STPLR SKN COVD -B: Performed by: ORTHOPAEDIC SURGERY

## 2017-08-15 PROCEDURE — 85730 THROMBOPLASTIN TIME PARTIAL: CPT | Performed by: ORTHOPAEDIC SURGERY

## 2017-08-15 PROCEDURE — 74011000250 HC RX REV CODE- 250

## 2017-08-15 PROCEDURE — 76060000033 HC ANESTHESIA 1 TO 1.5 HR: Performed by: ORTHOPAEDIC SURGERY

## 2017-08-15 PROCEDURE — 77030002933 HC SUT MCRYL J&J -A: Performed by: ORTHOPAEDIC SURGERY

## 2017-08-15 PROCEDURE — C9113 INJ PANTOPRAZOLE SODIUM, VIA: HCPCS | Performed by: HOSPITALIST

## 2017-08-15 PROCEDURE — 82306 VITAMIN D 25 HYDROXY: CPT | Performed by: NURSE PRACTITIONER

## 2017-08-15 PROCEDURE — 81001 URINALYSIS AUTO W/SCOPE: CPT | Performed by: ORTHOPAEDIC SURGERY

## 2017-08-15 PROCEDURE — 77030012893

## 2017-08-15 PROCEDURE — 76210000006 HC OR PH I REC 0.5 TO 1 HR: Performed by: ORTHOPAEDIC SURGERY

## 2017-08-15 PROCEDURE — 74011250636 HC RX REV CODE- 250/636: Performed by: ANESTHESIOLOGY

## 2017-08-15 PROCEDURE — C1713 ANCHOR/SCREW BN/BN,TIS/BN: HCPCS | Performed by: ORTHOPAEDIC SURGERY

## 2017-08-15 PROCEDURE — 77030014405 HC GD ROD RMR SYNT -C: Performed by: ORTHOPAEDIC SURGERY

## 2017-08-15 PROCEDURE — 74011250637 HC RX REV CODE- 250/637: Performed by: ORTHOPAEDIC SURGERY

## 2017-08-15 PROCEDURE — 85610 PROTHROMBIN TIME: CPT | Performed by: ORTHOPAEDIC SURGERY

## 2017-08-15 PROCEDURE — 74011250637 HC RX REV CODE- 250/637: Performed by: HOSPITALIST

## 2017-08-15 PROCEDURE — 84134 ASSAY OF PREALBUMIN: CPT | Performed by: NURSE PRACTITIONER

## 2017-08-15 PROCEDURE — 77030035168: Performed by: ORTHOPAEDIC SURGERY

## 2017-08-15 PROCEDURE — 74011250636 HC RX REV CODE- 250/636: Performed by: HOSPITALIST

## 2017-08-15 PROCEDURE — 77030018836 HC SOL IRR NACL ICUM -A: Performed by: ORTHOPAEDIC SURGERY

## 2017-08-15 PROCEDURE — 83970 ASSAY OF PARATHORMONE: CPT | Performed by: NURSE PRACTITIONER

## 2017-08-15 PROCEDURE — 77030020782 HC GWN BAIR PAWS FLX 3M -B: Performed by: ANESTHESIOLOGY

## 2017-08-15 DEVICE — IMPLANTABLE DEVICE: Type: IMPLANTABLE DEVICE | Site: FEMUR | Status: FUNCTIONAL

## 2017-08-15 RX ORDER — OXYCODONE HYDROCHLORIDE 5 MG/1
5 TABLET ORAL
Status: DISCONTINUED | OUTPATIENT
Start: 2017-08-15 | End: 2017-08-20 | Stop reason: HOSPADM

## 2017-08-15 RX ORDER — SODIUM CHLORIDE, SODIUM LACTATE, POTASSIUM CHLORIDE, CALCIUM CHLORIDE 600; 310; 30; 20 MG/100ML; MG/100ML; MG/100ML; MG/100ML
75 INJECTION, SOLUTION INTRAVENOUS CONTINUOUS
Status: DISPENSED | OUTPATIENT
Start: 2017-08-15 | End: 2017-08-16

## 2017-08-15 RX ORDER — LIDOCAINE HYDROCHLORIDE 20 MG/ML
INJECTION, SOLUTION EPIDURAL; INFILTRATION; INTRACAUDAL; PERINEURAL AS NEEDED
Status: DISCONTINUED | OUTPATIENT
Start: 2017-08-15 | End: 2017-08-15 | Stop reason: HOSPADM

## 2017-08-15 RX ORDER — SODIUM CHLORIDE 0.9 % (FLUSH) 0.9 %
5-10 SYRINGE (ML) INJECTION EVERY 8 HOURS
Status: DISCONTINUED | OUTPATIENT
Start: 2017-08-15 | End: 2017-08-20 | Stop reason: HOSPADM

## 2017-08-15 RX ORDER — LIDOCAINE HYDROCHLORIDE 10 MG/ML
0.1 INJECTION INFILTRATION; PERINEURAL AS NEEDED
Status: DISCONTINUED | OUTPATIENT
Start: 2017-08-15 | End: 2017-08-18

## 2017-08-15 RX ORDER — FERROUS SULFATE, DRIED 160(50) MG
1 TABLET, EXTENDED RELEASE ORAL
Status: DISCONTINUED | OUTPATIENT
Start: 2017-08-15 | End: 2017-08-20 | Stop reason: HOSPADM

## 2017-08-15 RX ORDER — DIPHENHYDRAMINE HYDROCHLORIDE 50 MG/ML
12.5 INJECTION, SOLUTION INTRAMUSCULAR; INTRAVENOUS
Status: DISCONTINUED | OUTPATIENT
Start: 2017-08-15 | End: 2017-08-15 | Stop reason: HOSPADM

## 2017-08-15 RX ORDER — HYDROMORPHONE HYDROCHLORIDE 2 MG/ML
0.5 INJECTION, SOLUTION INTRAMUSCULAR; INTRAVENOUS; SUBCUTANEOUS
Status: DISCONTINUED | OUTPATIENT
Start: 2017-08-15 | End: 2017-08-15 | Stop reason: HOSPADM

## 2017-08-15 RX ORDER — OXYCODONE HYDROCHLORIDE 5 MG/1
5 TABLET ORAL
Status: DISCONTINUED | OUTPATIENT
Start: 2017-08-15 | End: 2017-08-15 | Stop reason: HOSPADM

## 2017-08-15 RX ORDER — SODIUM CHLORIDE 0.9 % (FLUSH) 0.9 %
5-10 SYRINGE (ML) INJECTION AS NEEDED
Status: DISCONTINUED | OUTPATIENT
Start: 2017-08-15 | End: 2017-08-20 | Stop reason: HOSPADM

## 2017-08-15 RX ORDER — SODIUM CHLORIDE, SODIUM LACTATE, POTASSIUM CHLORIDE, CALCIUM CHLORIDE 600; 310; 30; 20 MG/100ML; MG/100ML; MG/100ML; MG/100ML
75 INJECTION, SOLUTION INTRAVENOUS CONTINUOUS
Status: DISCONTINUED | OUTPATIENT
Start: 2017-08-15 | End: 2017-08-15 | Stop reason: HOSPADM

## 2017-08-15 RX ORDER — SODIUM CHLORIDE, SODIUM LACTATE, POTASSIUM CHLORIDE, CALCIUM CHLORIDE 600; 310; 30; 20 MG/100ML; MG/100ML; MG/100ML; MG/100ML
75 INJECTION, SOLUTION INTRAVENOUS CONTINUOUS
Status: DISCONTINUED | OUTPATIENT
Start: 2017-08-15 | End: 2017-08-18

## 2017-08-15 RX ORDER — NALOXONE HYDROCHLORIDE 0.4 MG/ML
0.1 INJECTION, SOLUTION INTRAMUSCULAR; INTRAVENOUS; SUBCUTANEOUS
Status: DISCONTINUED | OUTPATIENT
Start: 2017-08-15 | End: 2017-08-15 | Stop reason: HOSPADM

## 2017-08-15 RX ORDER — CEFAZOLIN SODIUM IN 0.9 % NACL 2 G/50 ML
2 INTRAVENOUS SOLUTION, PIGGYBACK (ML) INTRAVENOUS
Status: COMPLETED | OUTPATIENT
Start: 2017-08-15 | End: 2017-08-15

## 2017-08-15 RX ORDER — ENOXAPARIN SODIUM 100 MG/ML
30 INJECTION SUBCUTANEOUS EVERY 24 HOURS
Status: DISCONTINUED | OUTPATIENT
Start: 2017-08-16 | End: 2017-08-20 | Stop reason: HOSPADM

## 2017-08-15 RX ORDER — NEOSTIGMINE METHYLSULFATE 1 MG/ML
INJECTION INTRAVENOUS AS NEEDED
Status: DISCONTINUED | OUTPATIENT
Start: 2017-08-15 | End: 2017-08-15 | Stop reason: HOSPADM

## 2017-08-15 RX ORDER — DOCUSATE SODIUM 100 MG/1
100 CAPSULE, LIQUID FILLED ORAL 2 TIMES DAILY
Status: DISCONTINUED | OUTPATIENT
Start: 2017-08-15 | End: 2017-08-20 | Stop reason: HOSPADM

## 2017-08-15 RX ORDER — ACETAMINOPHEN 325 MG/1
650 TABLET ORAL EVERY 8 HOURS
Status: DISCONTINUED | OUTPATIENT
Start: 2017-08-15 | End: 2017-08-20 | Stop reason: HOSPADM

## 2017-08-15 RX ORDER — OXYCODONE HYDROCHLORIDE 5 MG/1
10 TABLET ORAL
Status: DISCONTINUED | OUTPATIENT
Start: 2017-08-15 | End: 2017-08-15 | Stop reason: HOSPADM

## 2017-08-15 RX ORDER — ROCURONIUM BROMIDE 10 MG/ML
INJECTION, SOLUTION INTRAVENOUS AS NEEDED
Status: DISCONTINUED | OUTPATIENT
Start: 2017-08-15 | End: 2017-08-15 | Stop reason: HOSPADM

## 2017-08-15 RX ORDER — FLUMAZENIL 0.1 MG/ML
0.2 INJECTION INTRAVENOUS AS NEEDED
Status: DISCONTINUED | OUTPATIENT
Start: 2017-08-15 | End: 2017-08-15 | Stop reason: HOSPADM

## 2017-08-15 RX ORDER — ONDANSETRON 2 MG/ML
INJECTION INTRAMUSCULAR; INTRAVENOUS AS NEEDED
Status: DISCONTINUED | OUTPATIENT
Start: 2017-08-15 | End: 2017-08-15 | Stop reason: HOSPADM

## 2017-08-15 RX ORDER — MAG HYDROX/ALUMINUM HYD/SIMETH 200-200-20
30 SUSPENSION, ORAL (FINAL DOSE FORM) ORAL
Status: DISCONTINUED | OUTPATIENT
Start: 2017-08-15 | End: 2017-08-20 | Stop reason: HOSPADM

## 2017-08-15 RX ORDER — ONDANSETRON 2 MG/ML
4 INJECTION INTRAMUSCULAR; INTRAVENOUS
Status: DISCONTINUED | OUTPATIENT
Start: 2017-08-15 | End: 2017-08-20 | Stop reason: HOSPADM

## 2017-08-15 RX ORDER — PROPOFOL 10 MG/ML
INJECTION, EMULSION INTRAVENOUS AS NEEDED
Status: DISCONTINUED | OUTPATIENT
Start: 2017-08-15 | End: 2017-08-15 | Stop reason: HOSPADM

## 2017-08-15 RX ORDER — GLYCOPYRROLATE 0.2 MG/ML
INJECTION INTRAMUSCULAR; INTRAVENOUS AS NEEDED
Status: DISCONTINUED | OUTPATIENT
Start: 2017-08-15 | End: 2017-08-15 | Stop reason: HOSPADM

## 2017-08-15 RX ADMIN — CEFAZOLIN 2 G: 1 INJECTION, POWDER, FOR SOLUTION INTRAMUSCULAR; INTRAVENOUS; PARENTERAL at 15:55

## 2017-08-15 RX ADMIN — ALPRAZOLAM 1 MG: 0.5 TABLET ORAL at 18:21

## 2017-08-15 RX ADMIN — NEOSTIGMINE METHYLSULFATE 3 MG: 1 INJECTION INTRAVENOUS at 16:29

## 2017-08-15 RX ADMIN — ONDANSETRON 4 MG: 2 INJECTION INTRAMUSCULAR; INTRAVENOUS at 16:29

## 2017-08-15 RX ADMIN — INSULIN LISPRO 2 UNITS: 100 INJECTION, SOLUTION INTRAVENOUS; SUBCUTANEOUS at 08:46

## 2017-08-15 RX ADMIN — TRAMADOL HYDROCHLORIDE 50 MG: 50 TABLET, FILM COATED ORAL at 18:22

## 2017-08-15 RX ADMIN — PANTOPRAZOLE SODIUM 40 MG: 40 INJECTION, POWDER, FOR SOLUTION INTRAVENOUS at 21:02

## 2017-08-15 RX ADMIN — PROPOFOL 120 MG: 10 INJECTION, EMULSION INTRAVENOUS at 15:46

## 2017-08-15 RX ADMIN — PANTOPRAZOLE SODIUM 40 MG: 40 INJECTION, POWDER, FOR SOLUTION INTRAVENOUS at 08:45

## 2017-08-15 RX ADMIN — GLYCOPYRROLATE 0.4 MG: 0.2 INJECTION INTRAMUSCULAR; INTRAVENOUS at 16:29

## 2017-08-15 RX ADMIN — DOCUSATE SODIUM 100 MG: 100 CAPSULE, LIQUID FILLED ORAL at 21:02

## 2017-08-15 RX ADMIN — LIDOCAINE HYDROCHLORIDE 60 MG: 20 INJECTION, SOLUTION EPIDURAL; INFILTRATION; INTRACAUDAL; PERINEURAL at 15:46

## 2017-08-15 RX ADMIN — ROCURONIUM BROMIDE 30 MG: 10 INJECTION, SOLUTION INTRAVENOUS at 15:48

## 2017-08-15 RX ADMIN — CEFAZOLIN SODIUM 1 G: 1 INJECTION, POWDER, FOR SOLUTION INTRAMUSCULAR; INTRAVENOUS at 23:51

## 2017-08-15 RX ADMIN — PROPOFOL 20 MG: 10 INJECTION, EMULSION INTRAVENOUS at 15:48

## 2017-08-15 RX ADMIN — CALCIUM CARBONATE-CHOLECALCIFEROL TAB 250 MG-125 UNIT 1 TABLET: 250-125 TAB at 18:22

## 2017-08-15 RX ADMIN — CALCIUM CARBONATE 500 MG (1,250 MG)-VITAMIN D3 200 UNIT TABLET 1 TABLET: at 21:01

## 2017-08-15 RX ADMIN — FERROUS SULFATE TAB 325 MG (65 MG ELEMENTAL FE) 325 MG: 325 (65 FE) TAB at 18:22

## 2017-08-15 RX ADMIN — SODIUM CHLORIDE, SODIUM LACTATE, POTASSIUM CHLORIDE, AND CALCIUM CHLORIDE 75 ML/HR: 600; 310; 30; 20 INJECTION, SOLUTION INTRAVENOUS at 23:51

## 2017-08-15 RX ADMIN — ACETAMINOPHEN 650 MG: 325 TABLET, FILM COATED ORAL at 21:02

## 2017-08-15 NOTE — BRIEF OP NOTE
BRIEF OPERATIVE NOTE    Date of Procedure: 8/15/2017   Preoperative Diagnosis: Intertrochanteric fracture of right femur, closed, initial encounter ([facility] [I95.201A]  Postoperative Diagnosis: Intertrochanteric fracture of right femur, closed, initial encounter ([facility] Denise Ramos    Procedure(s):  RIGHT FEMUR INSERTION INTRA MEDULLARY NAIL  Surgeon(s) and Role:     * Kaye Funez MD - Primary         Assistant Staff:       Surgical Staff:  Circ-1: Sonia Carrion RN  Scrub Tech-1: Janis Grimes  Scrub Tech-2: Astrid Moss  Scrub Private/Assistant: Josué Montero  Event Time In   Incision Start 1610   Incision Close 1633     Anesthesia: General   Estimated Blood Loss: 50 cc  Specimens: * No specimens in log *   Findings: none   Complications: none  Implants:   Implant Name Type Inv.  Item Serial No.  Lot No. LRB No. Used Action   NAIL ROSEMARIE 125D [id number] RT -- TFNA STRL - [id number]  NAIL ROSEMARIE 125D [id number] RT -- Nettie Samuels [id number] Right 1 Implanted   BLADE HELCL TFNA 95MM STRL --  - [id number]   BLADE HELCL TFNA 95MM STRL --    [facility] [id number] Right 1 Implanted

## 2017-08-15 NOTE — PROGRESS NOTES
Problem: Interdisciplinary Rounds  Goal: Interdisciplinary Rounds  Outcome: Progressing Towards Goal  Interdisciplinary team rounds were held 8/15/2017 with the following team members:Care Management, Physical Therapy, Physician, Speech Therapy and . Anticipate discharge to rehab on Thursday. Plan of care discussed. See clinical pathway and/or care plan for interventions and desired outcomes.

## 2017-08-15 NOTE — PROGRESS NOTES
TRANSFER - OUT REPORT:    Verbal report given to Wesley Whittaker (name) on Ciro Flowers  being transferred to pre op (unit) for routine progression of care       Report consisted of patients Situation, Background, Assessment and   Recommendations(SBAR). Information from the following report(s) SBAR, Kardex, Intake/Output, MAR and Recent Results was reviewed with the receiving nurse. Lines:   Peripheral IV 08/14/17 Left Forearm (Active)   Site Assessment Clean, dry, & intact 8/14/2017  7:44 PM   Phlebitis Assessment 0 8/14/2017  7:44 PM   Infiltration Assessment 0 8/14/2017  7:44 PM   Dressing Status Clean, dry, & intact 8/14/2017  7:44 PM   Dressing Type Tape;Transparent 8/14/2017  7:44 PM   Hub Color/Line Status Infusing 8/14/2017  7:44 PM        Opportunity for questions and clarification was provided.       Patient transported with:  Transport

## 2017-08-15 NOTE — PROGRESS NOTES
Hospitalist Progress Note    8/15/2017  Admit Date: 2017  3:16 PM   NAME: Mehdi Arauz   :  7/15/1933   MRN:  [id number]   Attending: Hyla Alpers, DO  PCP:  Nasra Sam MD    SUBJECTIVE:   Patient is an 80years old male with pmhx of parkinsons disease and recurrent falls, currently being admitted for right nondisplaced intertrochanteric fracture. Pt also has chronic thrombocytopenia without any signs of mucosal or submucosal bleeding. Ortho has been consulted, and plan for surgery today afternoon 8/15.    8/15:  Seen at bedside. Complaining of right shoulder pain. Denies nausea or vomiting, chest pain, SOB, cough. Review of Systems negative with exception of pertinent positives noted above  PHYSICAL EXAM     Visit Vitals    BP 96/48 (BP 1 Location: Right arm, BP Patient Position: At rest)    Pulse 83    Temp 98.2 °F (36.8 °C)    Resp 18    SpO2 98%      Temp (24hrs), Av.8 °F (36.6 °C), Min:97.4 °F (36.3 °C), Max:98.2 °F (36.8 °C)    Oxygen Therapy  O2 Sat (%): 98 % (08/15/17 0719)  O2 Device: Room air (17 1536)    Intake/Output Summary (Last 24 hours) at 08/15/17 0835  Last data filed at 08/15/17 0443   Gross per 24 hour   Intake                0 ml   Output              650 ml   Net             -650 ml      General: No acute distress, elderly, frail    Lungs:  CTA Bilaterally. Heart:  Regular rate and rhythm,  No murmur, rub, or gallop  Abdomen: Soft, Non distended, Non tender, Positive bowel sounds  Extremities: No cyanosis, clubbing or edema  Neurologic:  gcs 15, no motor or sensory deficits, CN 2-12 intact  Psych:             AO x 2, mood and affect anxious    ASSESSMENT      Active Hospital Problems    Diagnosis Date Noted    Fracture closed, femur, shaft ([facility] 2017    Fracture 2017     Plan:  · Plan for ORIF for right nondisplaced intertrochanteric fracture  · Post op care and pain management as per ortho  · PT/OT eval after surgery.  Bed rest for now.  · PPD  · Will need SNF on discharge. · Continue other home meds as reconciled in [facility] · Risk high with opioids on board.     DVT Prophylaxis: SCD and TEDs    Signed By: Arline Reyes MD     August 15, 2017

## 2017-08-15 NOTE — PERIOP NOTES
TRANSFER - OUT REPORT:    Verbal report given Linh PATEL RN on Paola Robledo  being transferred to [phone] for routine post - op       Report consisted of patients Situation, Background, Assessment and   Recommendations(SBAR). Information from the following report(s) SBAR and OR Summary was reviewed with the receiving nurse. Lines:   Peripheral IV 08/14/17 Left Forearm (Active)   Site Assessment Clean, dry, & intact 8/15/2017  8:46 AM   Phlebitis Assessment 0 8/15/2017  8:46 AM   Infiltration Assessment 0 8/15/2017  8:46 AM   Dressing Status Clean, dry, & intact 8/15/2017  8:46 AM   Dressing Type Tape;Transparent 8/15/2017  8:46 AM   Hub Color/Line Status Infusing 8/15/2017  8:46 AM        Opportunity for questions and clarification was provided.       Patient transported with:   O2 @ 2 liters

## 2017-08-15 NOTE — ANESTHESIA PREPROCEDURE EVALUATION
Anesthetic History   No history of anesthetic complications            Review of Systems / Medical History  Patient summary reviewed and pertinent labs reviewed    Pulmonary                   Neuro/Psych         Psychiatric history    Comments: Anxiety/depression; hx of ETOH abuse until 2010 Cardiovascular                  Exercise tolerance: <4 METS     GI/Hepatic/Renal           PUD and liver disease    Comments: ETOH cirrhoses, esophageal varices.  Increased INR Endo/Other    Diabetes: type 2         Other Findings              Physical Exam    Airway  Mallampati: II  TM Distance: > 6 cm  Neck ROM: normal range of motion   Mouth opening: Normal     Cardiovascular    Rhythm: regular           Dental         Pulmonary                 Abdominal  GI exam deferred       Other Findings            Anesthetic Plan    ASA: 3  Anesthesia type: general          Induction: Intravenous  Anesthetic plan and risks discussed with: Patient

## 2017-08-15 NOTE — OP NOTES
Operative Report     Patient: Huber Chambers MRN: [id number]  SSN: [id number]    YOB: 1933  Age: 80 y.o. Sex: male      Indications: Huber Chambers was admitted to the hospital with a brief history of right intertrochanteric hip fracture. The patient now presents for ORIF. Date of Surgery: 8/15/2017     Preoperative Diagnosis:  Intertrochanteric fracture of right femur, closed, initial encounter ([facility] [S72.141A]    Postoperative Diagnosis: Intertrochanteric fracture of right femur, closed, [facility] [P92.271D]    Surgeon(s) and Role:     * Eve Cohen MD - Primary     Anesthesia: General    Procedures: Procedure(s):  RIGHT FEMUR INSERTION INTRA MEDULLARY NAIL       Procedure in Detail:  The patient was brought to the operative suite and placed in supine position. After adequate anesthesia was achieved, the patient was placed upon the fracture table. Peroneal post and foot holders were well padded. The patient underwent a closed reduction of the right intertrochanteric hip fracture. This was achieved by longitudinal traction, internal rotation, and adduction. AP and lateral fluoroscopic images confirmed the fracture was now reduced anatomically. right hip was then prepped and draped in the usual sterile fashion. A 3 cm incision was made over the tip of the greater trochanter. Hemostasis was achieved with Bovie cautery. Guide pin for a Synthes trochanteric fixation nail was inserted through the tip of the trochanter, across the fracture site, and into the canal of the femur. Its position was confirmed by fluoroscopy. The proximal reamer was then passed by hand over this guide pin in order to open up a proximal portal.  Guide pin and reamer were removed a ball-tip guide wire was inserted through this portal, across the fracture site, and down to the epiphyseal scar of the knee. The position of the guide wire was confirmed by AP and lateral fluoroscopic images.   The 11.5 mm reamer was passed as a sound, and reaming was only performed by power if necessary. The Synthes trochanteric fixation nail was then passed over the guide wire without difficulty. The guide wire was removed and the targeting guide was inserted through a stab wound in the lateral aspect of the proximal femur. Guide pin was then inserted through the lateral cortex into the neck and head. It stopped just short of the subchondral bone. AP and lateral fluoroscopic images confirmed that the position of the guide pin was centered in the head. Depth gauge was used to determine the appropriate length helical blade. The reamers were passed over the guide pin in order to open up the lateral cortex neck and head. The appropriate length helical blade was then passed over the guide wire without difficulty. The set screw was passed from above with a spring wrench. Traction was removed. The position of the helical blade was confirmed by fluoroscopy. The fracture was then compressed to a stable position, using the proximal targeting guide. At this point, the targeting guides were removed. AP and lateral fluoroscopic images confirmed the fracture was reduced anatomically. Wounds were then irrigated with normal saline and closed in layers. Sterile, compressive dressings were applied. The patient was then removed from the fracture table and transferred to the postanesthesia care unit, alert and oriented, under the care of anesthesia. Estimated Blood Loss: 50 cc    Specimens: * No specimens in log *      Implants:   Implant Name Type Inv.  Item Serial No.  Lot No. LRB No. Used Action   NAIL ROSEMARIE 125D [id number] RT -- TFNA STRL - [id number]  NAIL ROSEMARIE 125D [id number] RT -- Tomasa Carrington [place] [id number] Right 1 Implanted   BLADE HELCL TFNA 95MM STRL --  - [id number]   BLADE HELCL TFNA 95MM STRL --    [employer] [place] [id number] Right 1 Implanted       Tourniquet Time: * No tourniquets in log *     Closure: Primary    Complications: None     Signed By: Robbi Martinez MD     August 15, 2017

## 2017-08-15 NOTE — PROGRESS NOTES
TRANSFER - IN REPORT:    Verbal report received from Shayy Jenkins RN(name) on Magda Antonio  being received from [employer]) for routine post - op      Report consisted of patients Situation, Background, Assessment and   Recommendations(SBAR). Information from the following report(s) SBAR, Kardex, ED Summary, OR Summary, Procedure Summary, Intake/Output, MAR, Accordion, Recent Results, Med Rec Status and Cardiac Rhythm , was reviewed with the receiving nurse. Opportunity for questions and clarification was provided. Assessment completed upon patients arrival to unit and care assumed.

## 2017-08-15 NOTE — PROGRESS NOTES
Problem: Nutrition Deficit  Goal: *Optimize nutritional status  Nutrition  Reason for assessment: Referral received for General Nutrition Management and Supplementation Edouard Carter   Malnutrition Screening Tool at admission was negative. Assessment:   Diet order(s): CCHO regular/Nectar, then CCHO mechanical soft/Honey, then NPO 8/14  Food/Nutrition Patient History:  Patient with history remarkable for parkinson's disease, legally blind and partially deaf. No family currently at bedside. NPO for repair of R hip fracture today. Per SLP, requiring a modified diet. Anthropometrics:    Vitals 8/14/2017   Height 5' 7\"   Weight 160 lb   BMI 25.06 kg/m2   BMI of normal weight for age. Macronutrient needs:  EER:  0778-6749 kcal /day (20-25 kcal/kg listed BW)  EPR:  73-87 grams protein/day (1-1.2 grams/kg IBW)(GFR 58)-no h/o CKD noted. Intake/Comparative Standards: Per RD meal rounds: NPO. No recorded meal intakes. Nutrition Diagnosis: Predicted sub-optimal intake r/t decreased ability to consume sufficient oral intake as evidenced by patient legally blind with Parkinson's disease requiring a modified diet. Intervention:  Meals and snacks: Per MD/SLP  Nutrition Supplement Therapy: honey thickened mighty shake TID  Discharge Plan: unknown. Would benefit from continued supplementation.       Sudhir Puentes [address], [address] [place] [address], [address] [place] [address], [phone]

## 2017-08-15 NOTE — PROGRESS NOTES
Pt's 1130 RP=093, per PREOP RN, Humalog insulin to be held. PREOP RN stated they would cover it downstairs if need be. Will cont to monitor.

## 2017-08-16 ENCOUNTER — APPOINTMENT (OUTPATIENT)
Dept: GENERAL RADIOLOGY | Age: 82
DRG: 481 | End: 2017-08-16
Attending: INTERNAL MEDICINE
Payer: MEDICARE

## 2017-08-16 LAB
25(OH)D3+25(OH)D2 SERPL-MCNC: 43.1 NG/ML (ref 30–100)
BASOPHILS # BLD: 0 K/UL (ref 0–0.2)
BASOPHILS NFR BLD: 0 % (ref 0–2)
DIFFERENTIAL METHOD BLD: ABNORMAL
EOSINOPHIL # BLD: 0.2 K/UL (ref 0–0.8)
EOSINOPHIL NFR BLD: 6 % (ref 0.5–7.8)
ERYTHROCYTE [DISTWIDTH] IN BLOOD BY AUTOMATED COUNT: 15.3 % (ref 11.9–14.6)
GLUCOSE BLD STRIP.AUTO-MCNC: 144 MG/DL (ref 65–100)
GLUCOSE BLD STRIP.AUTO-MCNC: 164 MG/DL (ref 65–100)
GLUCOSE BLD STRIP.AUTO-MCNC: 215 MG/DL (ref 65–100)
GLUCOSE BLD STRIP.AUTO-MCNC: 217 MG/DL (ref 65–100)
HCT VFR BLD AUTO: 23.6 % (ref 41.1–50.3)
HGB BLD-MCNC: 8.2 G/DL (ref 13.6–17.2)
IMM GRANULOCYTES # BLD: 0 K/UL (ref 0–0.5)
IMM GRANULOCYTES NFR BLD: 0.3 % (ref 0–5)
LYMPHOCYTES # BLD: 0.4 K/UL (ref 0.5–4.6)
LYMPHOCYTES NFR BLD: 10 % (ref 13–44)
MCH RBC QN AUTO: 29.8 PG (ref 26.1–32.9)
MCHC RBC AUTO-ENTMCNC: 34.7 G/DL (ref 31.4–35)
MCV RBC AUTO: 85.8 FL (ref 79.6–97.8)
MM INDURATION POC: NORMAL MM (ref 0–5)
MONOCYTES # BLD: 0.4 K/UL (ref 0.1–1.3)
MONOCYTES NFR BLD: 9 % (ref 4–12)
NEUTS SEG # BLD: 2.9 K/UL (ref 1.7–8.2)
NEUTS SEG NFR BLD: 75 % (ref 43–78)
PLATELET # BLD AUTO: 53 K/UL (ref 150–450)
PMV BLD AUTO: 10.1 FL (ref 10.8–14.1)
PPD POC: 0 NEGATIVE
RBC # BLD AUTO: 2.75 M/UL (ref 4.23–5.67)
WBC # BLD AUTO: 3.9 K/UL (ref 4.3–11.1)

## 2017-08-16 PROCEDURE — 82962 GLUCOSE BLOOD TEST: CPT

## 2017-08-16 PROCEDURE — 36415 COLL VENOUS BLD VENIPUNCTURE: CPT | Performed by: NURSE PRACTITIONER

## 2017-08-16 PROCEDURE — 65270000029 HC RM PRIVATE

## 2017-08-16 PROCEDURE — C9113 INJ PANTOPRAZOLE SODIUM, VIA: HCPCS | Performed by: HOSPITALIST

## 2017-08-16 PROCEDURE — 97530 THERAPEUTIC ACTIVITIES: CPT

## 2017-08-16 PROCEDURE — 85025 COMPLETE CBC W/AUTO DIFF WBC: CPT | Performed by: NURSE PRACTITIONER

## 2017-08-16 PROCEDURE — 74011250637 HC RX REV CODE- 250/637: Performed by: HOSPITALIST

## 2017-08-16 PROCEDURE — 92526 ORAL FUNCTION THERAPY: CPT

## 2017-08-16 PROCEDURE — 74011250636 HC RX REV CODE- 250/636: Performed by: HOSPITALIST

## 2017-08-16 PROCEDURE — 74011250637 HC RX REV CODE- 250/637: Performed by: ORTHOPAEDIC SURGERY

## 2017-08-16 PROCEDURE — 74230 X-RAY XM SWLNG FUNCJ C+: CPT

## 2017-08-16 PROCEDURE — 74011000255 HC RX REV CODE- 255: Performed by: INTERNAL MEDICINE

## 2017-08-16 PROCEDURE — 97162 PT EVAL MOD COMPLEX 30 MIN: CPT

## 2017-08-16 PROCEDURE — 74011250637 HC RX REV CODE- 250/637: Performed by: NURSE PRACTITIONER

## 2017-08-16 PROCEDURE — 97166 OT EVAL MOD COMPLEX 45 MIN: CPT

## 2017-08-16 PROCEDURE — 92611 MOTION FLUOROSCOPY/SWALLOW: CPT

## 2017-08-16 PROCEDURE — 74011636637 HC RX REV CODE- 636/637: Performed by: HOSPITALIST

## 2017-08-16 PROCEDURE — 74011250636 HC RX REV CODE- 250/636: Performed by: NURSE PRACTITIONER

## 2017-08-16 PROCEDURE — 74011000258 HC RX REV CODE- 258: Performed by: NURSE PRACTITIONER

## 2017-08-16 RX ADMIN — INSULIN LISPRO 4 UNITS: 100 INJECTION, SOLUTION INTRAVENOUS; SUBCUTANEOUS at 16:42

## 2017-08-16 RX ADMIN — ALPRAZOLAM 2 MG: 0.5 TABLET ORAL at 08:15

## 2017-08-16 RX ADMIN — CALCIUM CARBONATE 500 MG (1,250 MG)-VITAMIN D3 200 UNIT TABLET 1 TABLET: at 12:29

## 2017-08-16 RX ADMIN — CYANOCOBALAMIN TAB 1000 MCG 1000 MCG: 1000 TAB at 08:15

## 2017-08-16 RX ADMIN — DOCUSATE SODIUM 100 MG: 100 CAPSULE, LIQUID FILLED ORAL at 08:15

## 2017-08-16 RX ADMIN — Medication 100 MG: at 08:15

## 2017-08-16 RX ADMIN — CALCIUM CARBONATE-CHOLECALCIFEROL TAB 250 MG-125 UNIT 1 TABLET: 250-125 TAB at 08:15

## 2017-08-16 RX ADMIN — INSULIN LISPRO 4 UNITS: 100 INJECTION, SOLUTION INTRAVENOUS; SUBCUTANEOUS at 12:29

## 2017-08-16 RX ADMIN — OXYCODONE HYDROCHLORIDE 5 MG: 5 TABLET ORAL at 10:31

## 2017-08-16 RX ADMIN — PANTOPRAZOLE SODIUM 40 MG: 40 INJECTION, POWDER, FOR SOLUTION INTRAVENOUS at 08:15

## 2017-08-16 RX ADMIN — FERROUS SULFATE TAB 325 MG (65 MG ELEMENTAL FE) 325 MG: 325 (65 FE) TAB at 08:15

## 2017-08-16 RX ADMIN — ACETAMINOPHEN 650 MG: 325 TABLET, FILM COATED ORAL at 12:28

## 2017-08-16 RX ADMIN — ACETAMINOPHEN 650 MG: 325 TABLET, FILM COATED ORAL at 21:14

## 2017-08-16 RX ADMIN — Medication 10 ML: at 21:16

## 2017-08-16 RX ADMIN — CALCIUM CARBONATE 500 MG (1,250 MG)-VITAMIN D3 200 UNIT TABLET 1 TABLET: at 08:15

## 2017-08-16 RX ADMIN — PANTOPRAZOLE SODIUM 40 MG: 40 INJECTION, POWDER, FOR SOLUTION INTRAVENOUS at 21:14

## 2017-08-16 RX ADMIN — DOCUSATE SODIUM 100 MG: 100 CAPSULE, LIQUID FILLED ORAL at 16:41

## 2017-08-16 RX ADMIN — LACTULOSE 10 G: 20 SOLUTION ORAL at 08:14

## 2017-08-16 RX ADMIN — ACETAMINOPHEN 650 MG: 325 TABLET, FILM COATED ORAL at 05:44

## 2017-08-16 RX ADMIN — TRAMADOL HYDROCHLORIDE 50 MG: 50 TABLET, FILM COATED ORAL at 08:15

## 2017-08-16 RX ADMIN — FERROUS SULFATE TAB 325 MG (65 MG ELEMENTAL FE) 325 MG: 325 (65 FE) TAB at 16:41

## 2017-08-16 RX ADMIN — BARIUM SULFATE 15 ML: 400 SUSPENSION ORAL at 13:54

## 2017-08-16 RX ADMIN — BARIUM SULFATE 30 ML: 400 SUSPENSION ORAL at 13:54

## 2017-08-16 RX ADMIN — ENOXAPARIN SODIUM 30 MG: 30 INJECTION SUBCUTANEOUS at 16:42

## 2017-08-16 RX ADMIN — ALLOPURINOL 100 MG: 100 TABLET ORAL at 08:15

## 2017-08-16 RX ADMIN — CALCIUM CARBONATE 500 MG (1,250 MG)-VITAMIN D3 200 UNIT TABLET 1 TABLET: at 16:41

## 2017-08-16 RX ADMIN — ALPRAZOLAM 2 MG: 0.5 TABLET ORAL at 16:41

## 2017-08-16 RX ADMIN — Medication 5 ML: at 12:30

## 2017-08-16 RX ADMIN — BARIUM SULFATE 15 ML: 400 PASTE ORAL at 13:53

## 2017-08-16 RX ADMIN — CALCIUM CARBONATE-CHOLECALCIFEROL TAB 250 MG-125 UNIT 1 TABLET: 250-125 TAB at 16:41

## 2017-08-16 RX ADMIN — CEFAZOLIN SODIUM 1 G: 1 INJECTION, POWDER, FOR SOLUTION INTRAMUSCULAR; INTRAVENOUS at 08:15

## 2017-08-16 RX ADMIN — BARIUM SULFATE 30 ML: 980 POWDER, FOR SUSPENSION ORAL at 13:53

## 2017-08-16 NOTE — CDMP QUERY
Please clarify if this patient is being treated/managed for:    PANCYTOPENIA in the setting of 85yo s/p surgery, h/o chronic thrombocytopenia, WBC 3.6->3.9, HGB 9.1->8.2, PLT 55->53 being managed with serial CBC monitoring. =>Other Explanation of clinical findings  =>Unable to Determine (no explanation of clinical findings)    The medical record reflects the following:    Risk Factors: h/o chronic thrombocytopenia    Clinical Indicators: WBC 3.6->3.9, HGB 9.1->8.2, PLT 55->53    Treatment: serial CBC monitoring    Please clarify and document your clinical opinion in the progress notes and discharge summary including the definitive and/or presumptive diagnosis, (suspected or probable), related to the above clinical findings. Please include clinical findings supporting your diagnosis.     Thank you,  Dion Torre RN  [phone]

## 2017-08-16 NOTE — PROGRESS NOTES
Physical Therapy Note:    Attempted to see patient this PM for physical therapy treatment session per BID plan of care. Patient currently off of the floor.  Thank you,    Juana Phillip, PT, DPT  8/16/17 14:30PM

## 2017-08-16 NOTE — ANESTHESIA POSTPROCEDURE EVALUATION
Post-Anesthesia Evaluation and Assessment    Patient: Lieutenant Nieto MRN: [id number]  SSN: [id number]    YOB: 1933  Age: 80 y.o. Sex: male       Cardiovascular Function/Vital Signs  Visit Vitals    /68    Pulse 94    Temp 36.4 °C (97.6 °F)    Resp 16    SpO2 100%       Patient is status post general anesthesia for Procedure(s):  RIGHT FEMUR INSERTION INTRA MEDULLARY NAIL. Nausea/Vomiting: None    Postoperative hydration reviewed and adequate. Pain:  Pain Scale 1: Numeric (0 - 10) (08/15/17 1722)  Pain Intensity 1: 0 (08/15/17 1722)   Managed    Neurological Status:   Neuro (WDL): Within Defined Limits (08/15/17 1722)  Neuro  Neurologic State: Alert;Confused (08/15/17 0408)  Orientation Level: Oriented to person;Disoriented to time;Disoriented to situation;Disoriented to place (08/15/17 0846)  Cognition: Follows commands;Poor safety awareness; Impulsive;Memory loss (08/15/17 0846)   At baseline    Mental Status and Level of Consciousness: Arousable    Pulmonary Status:   O2 Device: Nasal cannula (08/15/17 1647)   Adequate oxygenation and airway patent    Complications related to anesthesia: None    Post-anesthesia assessment completed.  No concerns    Signed By: Christel Seip, MD     August 15, 2017

## 2017-08-16 NOTE — CONSULTS
Geriatric Fracture Consult  Patient: Christopher Llamas  YOB: 1933   MRN: [id number]      Consult Date: 8/15/2017     Consulting Physician: DR Dedra Damian    Chief Complaint: RIGHT INTERTROCHANTERIC HIP FRACTURE; RIGHT CLAVICLE FRACTURE; OSTEOPOROSIS  History of Present Illness: Marylu Del Rosario is an 80 y.o.  male who is being seen for right hip pain after sustaining a fall at home on Monday. Onset of symptoms was abrupt with unchanged course since that time. The pain is located in the right hip. Patient describes the pain as continuous and rated as moderate. Pain has been associated with movement. Patient denies other injuries. Review of Systems: A comprehensive review of systems was negative except for that written in the History of Present Illness. ED Presentation Time: < 8 hours  Mechanism of Injury: Fall from standing  Ambulatory Status: Independent and Cane  Past Medical History:   Past Medical History:   Diagnosis Date    Alcohol abuse     AVM (arteriovenous malformation) of colon     Diabetes ([address]     Hypertension     Liver disease     Laennec's cirrhosis    Macular degeneration     Other ill-defined conditions     gout    Portal hypertension (HCC)     Psychiatric disorder     anxiety/depression    PUD (peptic ulcer disease)     Thrombocytopenia (HCC)     Varices, esophageal (HCC)        Allergies: No Known Allergies   Past Surgical History:   Past Surgical History:   Procedure Laterality Date    HX ORTHOPAEDIC  1999    repair fx right humeral neck with titanium    HX OTHER SURGICAL      sx for undescended testicle      Social History:   Social History     Social History    Marital status:      Spouse name: N/A    Number of children: N/A    Years of education: N/A     Occupational History    Not on file.      Social History Main Topics    Smoking status: Never Smoker    Smokeless tobacco: Never Used    Alcohol use Yes      Comment: quit  4/2010-patients wife states patient was alcoholic    Drug use: No    Sexual activity: Not on file     Other Topics Concern    Not on file     Social History Narrative      FAMILY HISTORY - REVIEWED - NO PERTINENT FAMILY HISTORY  Dwelling Status: Alone with Spouse/Significant Other  Current Anticoagulant Medications: DENIES  History of falls: YES  Prior Fractures: RIGHT PROXIMAL HUMERUS - 1999  Osteoporosis Medications: CALCIUM 600 MG + VIT D3  Bone Density Tests: NO  X-RAYS: Right Intertrochanteric Fracture  Physical Exam:   PATIENT COMPLAINING OF RIGHT HIP PAIN AFTER A FALL AT HOME. FOCUSED MUSCULOSKELETAL EXAM REVEALS DECREASED ROM TO RIGHT LE AND RIGHT UE. THERE IS SHORTENING AND EXTERNAL ROTATION NOTED TO RIGHT LE. PATIENT IS TENDER TO PALPATION OVER RIGHT HIP AND GROIN. PATIENT IS TENDER TO PALPATION OVER PROXIMAL CLAVICLE; PATIENT HAS PAIN WITH LOG ROLLING. N/V INTACT. DENIES OTHER INJURIES.       Assessment / Plan: ORIF RIGHT IT FRACTURE WITH IM NAIL; CLOSED TREATMENT RIGHT CLAVICLE; CONSULT PT/OT - WBAT RIGHT LE; WBAT RIGHT UE; RIGHT ARM SLING; STR  RISKS AND BENEFITS WERE ADDRESSED WITH PATIENT AND FAMILY - WIFE AND LUIS   Labs:    Lab Results   Component Value Date/Time    HGB 8.2 08/16/2017 04:35 AM    WBC 3.9 08/16/2017 04:35 AM    INR 1.2 08/15/2017 11:48 AM    Albumin 3.2 08/14/2017 12:30 PM      Preoperative Clearance: YES by Hospitalist          Signed by: Celeste Parkinson NP   Today's Date: August 16, 2017

## 2017-08-16 NOTE — PROGRESS NOTES
LTG: Patient will tolerate least restrictive diet without overt signs or symptoms of airway compromise. STG: Patient will tolerate mechanical soft with chopped meats and honey thick liquids without overt signs or symptoms of airway compromise. STG: Patient will participate in modified barium swallow study as clinically indicated. Speech language pathology: bedside swallow note: Daily Note    NAME/AGE/GENDER: Jonn Kenney is a 80 y.o. male  DATE: 8/16/2017  PRIMARY DIAGNOSIS: Intertrochanteric fracture of right femur, closed, initial encounter ([facility] [S72.141A]  Procedure(s) (LRB):  RIGHT FEMUR INSERTION INTRA MEDULLARY NAIL (Right) 1 Day Post-Op  ICD-10: Treatment Diagnosis: R13.12 Oropharyngeal Dysphagia. INTERDISCIPLINARY COLLABORATION: Registered Nurse  PRECAUTIONS/ALLERGIES: Review of patient's allergies indicates no known allergies. ASSESSMENT:Patient seen for diet tolerance this AM. Patient cooperative and pleasant, sitting in bedside chair. RN does not report difficulty with swallowing AM meal including honey thick liquids. However, they do report that wife c/o liquids being too thick and attempting to thin. No family at bedside during session. Patient was presented with nectar and honey thick liquids via cup sips. Wet vocal quality noted upon ST arrival, which cleared with cued throat clear. Delayed swallow initiation with honey and nectar thick cup sips. Inconsistent multiple swallows also palpated. Mild wet vocal quality following nectar thick liquids trials. He required verbal prompts to clear throat to improve vocal quality. Immediate cough noted with honey thick liquid trial. No additional trials presented. Patient will benefit from modified barium swallow study to objectively assess swallow function and assist with determining safest po diet. Results and recommendations to follow completion of study. Plan to complete study on 8/16/17.   ?????? ? ? This section established at most recent assessment??????????  PROBLEM LIST (Impairments causing functional limitations):  1. Oropharyngeal dysphagia  REHABILITATION POTENTIAL FOR STATED GOALS: Fair  PLAN OF CARE:   Patient will benefit from skilled intervention to address the following impairments. RECOMMENDATIONS AND PLANNED INTERVENTIONS (Benefits and precautions of therapy have been discussed with the patient.):  · PO:  Mechanical soft with chopped meat and vegetables  · Liquids:  honey  MEDICATIONS:  · Crushed in puree  COMPENSATORY STRATEGIES/MODIFICATIONS INCLUDING:  · Cup/sip  · Small sips and bites  OTHER RECOMMENDATIONS (including follow up treatment recommendations): · Family training/education  · Patient education  RECOMMENDED DIET MODIFICATIONS DISCUSSED WITH:  · Nursing  · Family  · Patient  FREQUENCY/DURATION: Continue to follow patient 3 times a week for duration of hospital stay to address above goals. RECOMMENDED REHABILITATION/EQUIPMENT: (at time of discharge pending progress):   Continue Skilled Therapy. SUBJECTIVE:   Cooperative, pleasant  History of Present Injury/Illness: Mr. Saumya Walker  has a past medical history of Alcohol abuse; AVM (arteriovenous malformation) of colon; Diabetes ([address] Hypertension; Liver disease; Macular degeneration; Other ill-defined conditions; Portal hypertension ([address] Psychiatric disorder; PUD (peptic ulcer disease); Thrombocytopenia ([address] and Varices, esophageal ([address] .  He also  has a past surgical history that includes orthopaedic (1999) and other surgical.   Present Symptoms: Coughing with liquids; dysphagia   Pain Intensity 1: 0  Pain Location 1: Leg, Shoulder  Pain Orientation 1: Right  Pain Intervention(s) 1: Medication (see MAR)  Pain Intensity 2: 0  Pain Intensity 3: 0  Current Medications:   No current facility-administered medications on file prior to encounter.       Current Outpatient Prescriptions on File Prior to Encounter   Medication Sig Dispense Refill    ferrous sulfate 325 mg (65 mg iron) cpER Take  by mouth.  levofloxacin (LEVAQUIN) 750 mg tablet Take 1 Tab by mouth daily. 10 Tab 0    lactulose (CHRONULAC) 10 gram/15 mL solution Take 10 g by mouth daily.  glipiZIDE (GLUCOTROL) 5 mg tablet Take 5 mg by mouth Daily (before breakfast).  cyanocobalamin (VITAMIN B-12) 1,000 mcg tablet Take 1,000 mcg by mouth daily.  Calcium-Cholecalciferol, D3, (CALCIUM 600 WITH VITAMIN D3) 600 mg(1,500mg) -400 unit cap Take 1 Cap by mouth daily. Indications: HYPOCALCEMIA      OTHER,NON-FORMULARY, Preser-vision takes BID      ALPRAZolam (XANAX) 2 mg tablet Take 2 mg by mouth two (2) times a day.  allopurinol (ZYLOPRIM) 100 mg tablet Take 100 mg by mouth daily.  multivitamin (ONE A DAY) tablet Take 1 Tab by mouth daily.  metFORMIN (GLUCOPHAGE) 500 mg tablet Take 500 mg by mouth two (2) times daily (with meals).  thiamine (B-1) 100 mg tablet Take 100 mg by mouth daily.  omeprazole (PRILOSEC) 40 mg capsule Take 40 mg by mouth daily. Current Dietary Status:  Per wife, mechanical soft with nectar thick liquids      Social History/Home Situation:      Home Environment: Private residence  One/Two Story Residence: One story  Living Alone: No  Support Systems: Spouse/Significant Other/Partner  Patient Expects to be Discharged to[de-identified] Unknown  Current DME Used/Available at Home: Cane, straight, Walker  Tub or Shower Type: Shower  OBJECTIVE:   Respiratory Status:        CXR Results:Minimal scarring/atelectasis in the left lung base  MRI/CT Results:No acute CT findings the brain. Chronic changes as described above  Oral Motor Structure/Speech:  Oral-Motor Structure/Motor Speech  Labial: Decreased rate  Oral Hygiene: Adequate  Lingual: Decreased rate    Cognitive and Communication Status:  Neurologic State: Alert  Orientation Level: Oriented to person;Disoriented to situation;Disoriented to time  Cognition: Decreased attention/concentration; Follows commands  Perception: Appears intact  Perseveration: Perseverates during conversation  Safety/Judgement: Decreased awareness of environment;Decreased awareness of need for assistance    BEDSIDE SWALLOW EVALUATION  Oral Assessment:  Oral Assessment  Labial: Decreased rate  Oral Hygiene: Adequate  Lingual: Decreased rate  P.O. Trials:  Patient Position: Bedside chair    The patient was given tsp-small bite amounts of the following:   Consistency Presented: Honey thick liquid; Nectar thick liquid  How Presented: Cup/sip    ORAL PHASE:  Bolus Acceptance: No impairment  Bolus Formation/Control: Impaired  Propulsion: Delayed (# of seconds)  Type of Impairment: Delayed  Oral Residue: None    PHARYNGEAL PHASE:  Initiation of Swallow: Delayed (# of seconds)  Laryngeal Elevation: Decreased  Aspiration Signs/Symptoms: Delayed cough/throat clear;Strong cough  Vocal Quality: No impairment     Effective Modifications: Cup/sip     Pharyngeal Phase Characteristics: Double swallow    OTHER OBSERVATIONS:  Rate/bite size: Impaired   Endurance:  Impaired   Comments:       Tool Used: Dysphagia Outcome and Severity Scale (GUSTAVO)    Score Comments   Normal Diet  [] 7 With no strategies or extra time needed   Functional Swallow  [] 6 May have mild oral or pharyngeal delay       Mild Dysphagia    [] 5 Which may require one diet consistency restricted (those who demonstrate penetration which is entirely cleared on MBS would be included)   Mild-Moderate Dysphagia  [] 4 With 1-2 diet consistencies restricted       Moderate Dysphagia  [x] 3 With 2 or more diet consistencies restricted       Moderately Severe Dysphagia  [] 2 With partial PO strategies (trials with ST only)       Severe Dysphagia  [] 1 With inability to tolerate any PO safely          Score:  Initial: 7 Most Recent: X (Date: -- )   Interpretation of Tool: The Dysphagia Outcome and Severity Scale (GUSTAVO) is a simple, easy-to-use, 7-point scale developed to systematically rate the functional severity of dysphagia based on objective assessment and make recommendations for diet level, independence level, and type of nutrition. Score 7 6 5 4 3 2 1   Modifier CH CI CJ CK CL CM CN   ? Swallowing:     - CURRENT STATUS: CL - 60%-79% impaired, limited or restricted    - GOAL STATUS:  CK - 40%-59% impaired, limited or restricted    - D/C STATUS:  ---------------To be determined---------------  [place] / Product Type: Medicare /     TREATMENT:    (In addition to Assessment/Re-Assessment sessions the following treatments were rendered)  Assessment/Reassessment only, no treatment provided today  MODALITIES:                                                                    ORAL MOTOR  EXERCISES:                                                                                                                                                                      LARYNGEAL / PHARYNGEAL EXERCISES:                                                                                                                                     __________________________________________________________________________________________________  Safety:   After treatment position/precautions:  · RN notified  · Upright in Bed  Progression/Medical Necessity:   · Patient is expected to demonstrate progress in swallow function to decrease aspiration risk. Compliance with Program/Exercises: Will assess as treatment progresses. Reason for Continuation of Services/Other Comments:  · Patient continues to require skilled intervention due to Dysphagia. Recommendations/Intent for next treatment session: \"Treatment next visit will focus on diet tolerance. \".     Total Treatment Duration:  Time In: 1100  Time Out: 1120    DIONISIO Infante, CCC-SLP, CBIS

## 2017-08-16 NOTE — PROGRESS NOTES
Problem: Self Care Deficits Care Plan (Adult)  Goal: *Acute Goals and Plan of Care (Insert Text)  1. Patient will maintain WBAT status in RLE for 100% of treatment session and minimal verbal cues from therapist.   2. Patient will complete functional transfers with minimal assistance while maintaining WBAT status in RLE and with adaptive equipment as needed. 3. Patient will complete lower body bathing and dressing with minimal assistance and adaptive equipment as needed. 4. Patient will complete toileting and toilet transfer with minimal assistance. 5. Patient will tolerate 23 minutes of OT treatment with less than 3 rest breaks to increase activity tolerance for ADLs. 6. Patient will participate in at least 23 minutes of BUE therapeutic exercises to strengthen BUE for functional transfers. Timeframe: 7 visits     Comments:       OCCUPATIONAL THERAPY: Initial Assessment and AM 8/16/2017  INPATIENT: Hospital Day: 3  [place] / Product Type: Medicare /      NAME/AGE/GENDER: Maira De Jesus is a 80 y.o. male        PRIMARY DIAGNOSIS:  Intertrochanteric fracture of right femur, closed, initial encounter ([facility] Shanae Soto <principal problem not specified> <principal problem not specified>  Procedure(s) (LRB):  RIGHT FEMUR INSERTION INTRA MEDULLARY NAIL (Right)  1 Day Post-Op  ICD-10: Treatment Diagnosis:        · Pain in Right Hip (M25.551)  · Stiffness of Right Hip, Not elsewhere classified (M25.651)  · Generalized Muscle Weakness (M62.81)  · Repeated Falls (R29.6)  · History of falling (Z91.81)   Precautions/Allergies:        R WBAT Review of patient's allergies indicates no known allergies. ASSESSMENT:      Mr. Peptio Malone presents to hospital for s/p above procedure. He lives with his wife at baseline in a [facility] with steps to enter. He has a PMHx which includes Parkinson's, legal blindness, and hearing aid use.  Pt reports 1 fall per month over the last 6 month and he reports that he uses a cane for functional mobility, although he has access to a standard walker and a manual wheelchair. Today, pt is supine in bed upon arrival. He is alert and oriented to person only. He appears confused at times with slow processing; this may be baseline. Pt requires maximal assistance x2 to move from supine to sitting at EOB with fair static and poor dynamic sitting balance. Pt perseverates frequently during mobility and during conversation, requires verbal cues to redirect conversation. He was left at EOB with PT. Mr. Saumya Walker requires continued skilled OT services in order to maximize safety and independence with ADLs/functional transfers in addition to decreasing burden of care. Will follow during acute stay. This section established at most recent assessment   PROBLEM LIST (Impairments causing functional limitations):  1. Decreased Strength  2. Decreased ADL/Functional Activities  3. Decreased Transfer Abilities  4. Decreased Ambulation Ability/Technique  5. Decreased Balance  6. Increased Pain  7. Decreased Activity Tolerance  8. Decreased Work Simplification/Energy Conservation Techniques  9. Increased Fatigue  10. Decreased Flexibility/Joint Mobility  11. Edema/Girth  12. Decreased Knowledge of Precautions  13. Decreased Assumption with Home Exercise Program  14. Decreased Cognition    INTERVENTIONS PLANNED: (Benefits and precautions of occupational therapy have been discussed with the patient.)  1. Activities of daily living training  2. Adaptive equipment training  3. Balance training  4. Clothing management  5. Cognitive training  6. Donning&doffing training  7. Group therapy  8. Therapeutic activity  9. Therapeutic exercise      TREATMENT PLAN: Frequency/Duration: Follow patient 6x/week to address above goals. Rehabilitation Potential For Stated Goals: FAIR      RECOMMENDED REHABILITATION/EQUIPMENT: (at time of discharge pending progress): Continue Skilled Therapy. OCCUPATIONAL PROFILE AND HISTORY:   History of Present Injury/Illness (Reason for Referral):  See H&P  Past Medical History/Comorbidities:   Mr. Casey Erazo  has a past medical history of Alcohol abuse; AVM (arteriovenous malformation) of colon; Diabetes ([facility] Hypertension; Liver disease; Macular degeneration; Other ill-defined conditions; Portal hypertension ([address] Psychiatric disorder; PUD (peptic ulcer disease); Thrombocytopenia ([facility] and Varices, esophageal ([facility] Mr. Casey Erazo  has a past surgical history that includes orthopaedic (1999) and other surgical.  Social History/Living Environment:   Home Environment: Private residence  One/Two Story Residence: One story  Living Alone: No  Support Systems: Spouse/Significant Other/Partner  Patient Expects to be Discharged to[de-identified] Unknown  Current DME Used/Available at Home: Cane, straight, Walker  Tub or Shower Type: Shower  Prior Level of Function/Work/Activity:  Wife not present to assist with baseline data; it is assumed that he requires assistance at baseline with ADLs  Personal Factors:          Sex:  male        Age:  80 y.o. Number of Personal Factors/Comorbidities that affect the Plan of Care: Expanded review of therapy/medical records (1-2):  MODERATE COMPLEXITY   ASSESSMENT OF OCCUPATIONAL PERFORMANCE[de-identified]   Activities of Daily Living:           Basic ADLs (From Assessment) Complex ADLs (From Assessment)   Basic ADL  Feeding: Minimum assistance  Oral Facial Hygiene/Grooming: Moderate assistance  Bathing: Maximum assistance  Upper Body Dressing: Minimum assistance  Lower Body Dressing: Total assistance  Toileting: Total assistance Instrumental ADL  Meal Preparation: Total assistance  Homemaking:  Total assistance  Medication Management: Total assistance  Financial Management: Total assistance   Grooming/Bathing/Dressing Activities of Daily Living     Cognitive Retraining  Safety/Judgement: Decreased awareness of environment;Decreased awareness of need for assistance                       Bed/Mat Mobility  Rolling: Total assistance  Supine to Sit: Maximum assistance;Assist x2  Bed to Chair: Total assistance (via sheet transfer)  Scooting: Total assistance          Most Recent Physical Functioning:   Gross Assessment:  AROM: Generally decreased, functional  Strength: Generally decreased, functional  Coordination: Generally decreased, functional  Tone: Abnormal               Posture:  Posture (WDL): Exceptions to WDL  Posture Assessment: Forward head, Trunk flexion  Balance:  Sitting: Impaired  Sitting - Static: Fair (occasional)  Sitting - Dynamic: Poor (constant support)  Standing: Impaired  Standing - Static: Poor Bed Mobility:  Rolling: Total assistance  Supine to Sit: Maximum assistance;Assist x2  Scooting: Total assistance  Wheelchair Mobility:     Transfers:  Bed to Chair: Total assistance (via sheet transfer)  Interventions: Safety awareness training; Tactile cues; Verbal cues                    Patient Vitals for the past 6 hrs:       BP BP Patient Position SpO2 Pulse   08/16/17 0729 99/54 At rest 96 % 83   08/16/17 1134 98/51 Sitting 95 % 87        Mental Status  Neurologic State: Alert  Orientation Level: Oriented to person, Disoriented to situation, Disoriented to time  Cognition: Decreased attention/concentration, Follows commands  Perception: Appears intact  Perseveration: Perseverates during conversation  Safety/Judgement: Decreased awareness of environment, Decreased awareness of need for assistance                               Physical Skills Involved:  1. Range of Motion  2. Balance  3. Strength  4. Activity Tolerance  5. Gross Motor Control  6. Vision  7. Pain (acute) Cognitive Skills Affected (resulting in the inability to perform in a timely and safe manner):  1. Perception  2. Executive Function  3. Immediate Memory  4. Short Term Recall  5. Long Term Memory  6. Sustained Attention  7. Divided Attention  8. Comprehension  9.  Expression Psychosocial Skills Affected:  1. Habits/Routines   Number of elements that affect the Plan of Care: 5+:  HIGH COMPLEXITY   CLINICAL DECISION MAKIN[address] [place] [address] AM-PAC 6 Clicks   Daily Activity Inpatient Short Form  How much help from another person does the patient currently need. .. Total A Lot A Little None   1. Putting on and taking off regular lower body clothing? [X] 1   [ ] 2   [ ] 3   [ ] 4   2. Bathing (including washing, rinsing, drying)? [ ] 1   [X] 2   [ ] 3   [ ] 4   3. Toileting, which includes using toilet, bedpan or urinal?   [X] 1   [ ] 2   [ ] 3   [ ] 4   4. Putting on and taking off regular upper body clothing?   [ ] 1   [ ] 2   [X] 3   [ ] 4   5. Taking care of personal grooming such as brushing teeth? [ ] 1   [ ] 2   [X] 3   [ ] 4   6. Eating meals? [ ] 1   [ ] 2   [X] 3   [ ] 4   © , Trustees of [address] [place] [address], under license to [employer]. All rights reserved    Score:  Initial: 13 Most Recent: X (Date: -- )     Interpretation of Tool:  Represents activities that are increasingly more difficult (i.e. Bed mobility, Transfers, Gait). Score 24 23 22-20 19-15 14-10 9-7 6       Modifier CH CI CJ CK CL CM CN         · Self Care:               - CURRENT STATUS:    CL - 60%-79% impaired, limited or restricted               - GOAL STATUS:           CK - 40%-59% impaired, limited or restricted               - D/C STATUS:                       ---------------To be determined---------------  [place] / Product Type: Medicare /       Medical Necessity:     · Patient is expected to demonstrate progress in strength, balance, coordination and functional technique to decrease assistance required with ADLs. Reason for Services/Other Comments:  · Patient continues to require skilled intervention due to patient unable to attend/participate in therapy as expected.    Use of outcome tool(s) and clinical judgement create a POC that gives a: MODERATE COMPLEXITY             TREATMENT:   (In addition to Assessment/Re-Assessment sessions the following treatments were rendered)      Pre-treatment Symptoms/Complaints:    Pain: Initial:   Pain Intensity 1: 5  Pain Location 1: Hip  Pain Orientation 1: Right  Pain Intervention(s) 1: Emotional support, Repositioned  Post Session:  Same, RN aware      Assessment/Reassessment only, no treatment provided today     Braces/Orthotics/Lines/Etc:   · IV  · O2 Device: Nasal cannula  Treatment/Session Assessment:    · Response to Treatment:  eval only  · Interdisciplinary Collaboration:  · Physical Therapist  · Occupational Therapist  · Registered Nurse  · Certified Nursing Assistant/Patient Care Technician  · After treatment position/precautions:  · EOB with PT  · Compliance with Program/Exercises: Will assess as treatment progresses. · Recommendations/Intent for next treatment session: \"Next visit will focus on advancements to more challenging activities and reduction in assistance provided\".   Total Treatment Duration:  OT Patient Time In/Time Out  Time In: 0945  Time Out: [address]

## 2017-08-16 NOTE — PROGRESS NOTES
Problem: Falls - Risk of  Goal: *Absence of Falls  Document Matheus Fall Risk and appropriate interventions in the flowsheet.    Outcome: Progressing Towards Goal  Fall Risk Interventions:  Mobility Interventions: Bed/chair exit alarm, Communicate number of staff needed for ambulation/transfer, Patient to call before getting OOB, PT Consult for assist device competence     Mentation Interventions: Bed/chair exit alarm, Evaluate medications/consider consulting pharmacy, More frequent rounding     Medication Interventions: Bed/chair exit alarm, Evaluate medications/consider consulting pharmacy, Patient to call before getting OOB     Elimination Interventions: Bed/chair exit alarm, Call light in reach, Patient to call for help with toileting needs     History of Falls Interventions: Bed/chair exit alarm, Consult care management for discharge planning, Evaluate medications/consider consulting pharmacy

## 2017-08-16 NOTE — PROGRESS NOTES
Hospitalist Progress Note    2017  Admit Date: 2017  3:16 PM   NAME: Fartun Winston   :  7/15/1933   MRN:  [id number]   Attending: Melany Ray DO  PCP:  Quintin Martin MD    SUBJECTIVE:   Patient is an 80years old male with pmhx of parkinsons disease and recurrent falls, currently being admitted for right nondisplaced intertrochanteric fracture. Pt also has chronic thrombocytopenia without any signs of mucosal or submucosal bleeding. Ortho has been consulted, and plan for surgery today afternoon 8/15.    :  Seen at bedside. Complaining of right shoulder pain 8/10, right leg pain 10/10  Denies nausea or vomiting, chest pain, SOB, cough. Review of Systems negative with exception of pertinent positives noted above  PHYSICAL EXAM     Visit Vitals    BP 99/54 (BP 1 Location: Right arm, BP Patient Position: At rest)    Pulse 83    Temp 98.3 °F (36.8 °C)    Resp 16    SpO2 96%      Temp (24hrs), Av.8 °F (36.6 °C), Min:97.3 °F (36.3 °C), Max:98.3 °F (36.8 °C)    Oxygen Therapy  O2 Sat (%): 96 % (17 0729)  Pulse via Oximetry: 88 beats per minute (08/15/17 1722)  O2 Device: Nasal cannula (08/15/17 1647)    Intake/Output Summary (Last 24 hours) at 17 0752  Last data filed at 17 0557   Gross per 24 hour   Intake              900 ml   Output             1100 ml   Net             -200 ml      General: No acute distress, elderly, frail    Lungs:  CTA Bilaterally.    Heart:  Regular rate and rhythm,  No murmur, rub, or gallop  Abdomen: Soft, Non distended, Non tender, Positive bowel sounds  Extremities: No cyanosis, clubbing or edema, surgical dressing on right hip, bruise over right shoulder  Neurologic:  gcs 15, no motor or sensory deficits, CN 2-12 intact  Psych:             AO x 2, mood and affect anxious    ASSESSMENT      Active Hospital Problems    Diagnosis Date Noted    Fracture closed, femur, shaft ([facility] 2017    Fracture 2017     Plan:  · S/p ORIF for right intertrochanteric hip fracture: POD # 1  · Post op care and pain management as per ortho  · PT/OT eval after surgery. Bed rest for now. · PPD  · Will need SNF on discharge. · Continue other home meds as reconciled in [facility] · Risk high with opioids on board.     DVT Prophylaxis: SCD and TEDs    Signed By: Yolette Strong MD     August 16, 2017

## 2017-08-16 NOTE — PROGRESS NOTES
Problem: Mobility Impaired (Adult and Pediatric)  Goal: *Acute Goals and Plan of Care (Insert Text)  STG:  (1.)Mr. Vania Barber will perform bed mobility with MODERATE ASSIST within 3 day(s). (2.)Mr. Vania Barber will transfer from bed to chair and chair to bed with MODERATE ASSIST using the least restrictive device within 3 day(s). (3.)Mr. Vania Barber will ambulate with MODERATE ASSIST for 10+ feet with the least restrictive device within 3 day(s). (4.)Mr. Vania Barber will tolerate 15+ minutes of therapeutic activity/exercise while maintaining stable vitals to improve functional strength and endurance within 3 day(s). LTG:  (1.)Mr. Vania Barber will perform bed mobility with MINIMAL ASSIST within 7 day(s). (2.)Mr. Vania Barber will transfer from bed to chair and chair to bed with MINIMAL ASSIST using the least restrictive device within 7 day(s). (3.)Mr. Vania Barber will ambulate with MINIMAL ASSIST for 25+ feet with the least restrictive device within 7 day(s). (4.)Mr. Vania Barber will tolerate 25+ minutes of therapeutic activity/exercise while maintaining stable vitals to improve functional strength and endurance within 7 day(s).   ________________________________________________________________________________________________      PHYSICAL THERAPY: INITIAL ASSESSMENT, TREATMENT DAY: DAY OF ASSESSMENT, AM    8/16/2017  INPATIENT: Hospital Day: 3  [place] / Product Type: Medicare /       AMARIS POLK     NAME/AGE/GENDER: Lola Reed is a 80 y.o. male        PRIMARY DIAGNOSIS: Intertrochanteric fracture of right femur, closed, [facility] Bean Rubin <principal problem not specified> <principal problem not specified>  Procedure(s) (LRB):  RIGHT FEMUR INSERTION INTRA MEDULLARY NAIL (Right)  1 Day Post-Op  ICD-10: Treatment Diagnosis:       · Generalized Muscle Weakness (M62.81)  · Difficulty in walking, Not elsewhere classified (R26.2)  · History of falling (Z91.81)   Precaution/Allergies:  Review of patient's allergies indicates no known allergies. ASSESSMENT:      Mr. Nyla Wolff is a 80year old male 1 day s/p right femoral IM nailing and is WBAT R LE. Patient seen this AM for initial physical therapy evaluation: presents edge of bed s/p OT assessment, oriented to person/history, disoriented to place and situation, and visually 8/10 R LE and R UE pain. Patient lives with his spouse in a single story residence with steps to enter. At baseline, patient has a history significant for Parkinson's Disease as well as being legally blind and hard of hearing per chart. Mr. Nyla Wolff endorses frequent falls at home, requires assistance with ADLs from spouse, and ambulates household distances with a SPC. Today, R UE ROM limited s/p fall, rigidity appreciated in bilateral hands and intermittently B LE, B LE. Bruising and extreme tenderness along right clavicle and acromio-clavicular joint. Patient demonstrated intermittent fair static sitting balance with L UE support and poor dynamic sitting balance with posterior and left lateral lean. Attempted sit to stand x2 with maximal assistance x2. Patient resistive to mobility, stating, \"No! I can't! I won't. \" Patient able to stand errect with maximal assistance x2 at [facility] demonstrating a strong posterior lean, resistive to correction. Unsafe to perform SPT to chair secondary to patient's resistance. Transitioned back into supine with total assistance x2 and performed sheet transfer bed to chair with total assistance x5. Mr. Nyla Wolff presents with decreased functional mobility and balance/gait status from baseline. Will follow with acute PT BID plan of care and progress toward stated goals during acute stay. This section established at most recent assessment   PROBLEM LIST (Impairments causing functional limitations):  1. Decreased Strength  2. Decreased ADL/Functional Activities  3. Decreased Transfer Abilities  4. Decreased Ambulation Ability/Technique  5.  Decreased Balance  6. Increased Pain  7. Decreased Activity Tolerance  8. Increased Shortness of Breath  9. Decreased Flexibility/Joint Mobility  10. Decreased Knowledge of Precautions  11. Decreased [place] with Home Exercise Program  12. Decreased Cognition    INTERVENTIONS PLANNED: (Benefits and precautions of physical therapy have been discussed with the patient.)  1. Balance Exercise  2. Bed Mobility  3. Family Education  4. Gait Training  5. Home Exercise Program (HEP)  6. Range of Motion (ROM)  7. Therapeutic Activites  8. Ultrasound (US)  9. Group Therapy      TREATMENT PLAN: Frequency/Duration: twice daily for duration of hospital stay  Rehabilitation Potential For Stated Goals: Manual Lace REHABILITATION/EQUIPMENT: (at time of discharge pending progress): Continue Skilled Therapy. HISTORY:   History of Present Injury/Illness (Reason for Referral):  S/p right femoral IM nailing  Past Medical History/Comorbidities:   Mr. Pepito Malone  has a past medical history of Alcohol abuse; AVM (arteriovenous malformation) of colon; Diabetes ([address] Hypertension; Liver disease; Macular degeneration; Other ill-defined conditions; Portal hypertension ([address] Psychiatric disorder; PUD (peptic ulcer disease); Thrombocytopenia ([address] and Varices, esophageal ([address] Mr. Pepito Malone  has a past surgical history that includes orthopaedic (1999) and other surgical.  Social History/Living Environment:   Home Environment: Private residence  One/Two Story Residence: One story  Living Alone: No  Support Systems: Spouse/Significant Other/Partner  Patient Expects to be Discharged to[de-identified] Unknown  Current DME Used/Available at Home: Cane, straight, Walker  Tub or Shower Type: Shower  Prior Level of Function/Work/Activity:   Patient lives with his spouse in a single story residence with steps to enter.  At baseline, patient has a history significant for Parkinson's Disease with frequent falls, requiring assistance with ADLs from spouse, and ambulates household distances with a SPC. Number of Personal Factors/Comorbidities that affect the Plan of Care: 1-2: MODERATE COMPLEXITY   EXAMINATION:   Most Recent Physical Functioning:   Gross Assessment:  AROM: Generally decreased, functional (R UE; R LE)  Strength: Generally decreased, functional (B LE R>L)  Coordination: Generally decreased, functional (B UE/LE)  Tone: Abnormal               Posture:  Posture (WDL): Exceptions to WDL  Posture Assessment: Forward head, Trunk flexion  Balance:  Sitting: Impaired  Sitting - Static: Fair (occasional)  Sitting - Dynamic: Poor (constant support)  Standing: Impaired  Standing - Static: Poor Bed Mobility:  Rolling: Total assistance  Supine to Sit: Maximum assistance;Assist x2  Scooting: Total assistance  Wheelchair Mobility:     Transfers:  Bed to Chair: Total assistance (via sheet transfer)  Interventions: Safety awareness training; Tactile cues; Verbal cues  Gait:             Body Structures Involved:  1. Bones  2. Joints  3. Muscles  4. Ligaments Body Functions Affected:  1. Neuromusculoskeletal  2. Movement Related Activities and Participation Affected:  1. Mobility  2. Self Care  3. Domestic Life   Number of elements that affect the Plan of Care: 4+: HIGH COMPLEXITY   CLINICAL PRESENTATION:   Presentation: Evolving clinical presentation with changing clinical characteristics: MODERATE COMPLEXITY   CLINICAL DECISION MAKIN [facility] Inpatient Short Form  How much difficulty does the patient currently have. .. Unable A Lot A Little None   1. Turning over in bed (including adjusting bedclothes, sheets and blankets)? [ ] 1   [X] 2   [ ] 3   [ ] 4   2. Sitting down on and standing up from a chair with arms ( e.g., wheelchair, bedside commode, etc.)   [ ] 1   [X] 2   [ ] 3   [ ] 4   3. Moving from lying on back to sitting on the side of the bed?    [ ] 1   [X] 2   [ ] 3   [ ] 4   How much help from another person does the patient currently need. .. Total A Lot A Little None   4. Moving to and from a bed to a chair (including a wheelchair)? [X] 1   [ ] 2   [ ] 3   [ ] 4   5. Need to walk in hospital room? [X] 1   [ ] 2   [ ] 3   [ ] 4   6. Climbing 3-5 steps with a railing? [X] 1   [ ] 2   [ ] 3   [ ] 4   © 2007, Trustees of [address] [place] [address] [address] [address], under license to [employer]. All rights reserved    Score:  Initial: 9 Most Recent: 9 (Date: 8/16/17 )     Interpretation of Tool:  Represents activities that are increasingly more difficult (i.e. Bed mobility, Transfers, Gait). Score 24 23 22-20 19-15 14-10 9-7 6       Modifier CH CI CJ CK CL CM CN         · Mobility - Walking and Moving Around:               - CURRENT STATUS:    CM - 80%-99% impaired, limited or restricted               - GOAL STATUS:           CL - 60%-79% impaired, limited or restricted               - D/C STATUS:                       ---------------To be determined---------------  [place] / [place] / Product Type: Medicare /       Medical Necessity:     · Patient demonstrates fair rehab potential due to higher previous functional level. Reason for Services/Other Comments:  · Patient continues to require skilled intervention due to decreased functional mobility and balance/gait status from baseline. .   Use of outcome tool(s) and clinical judgement create a POC that gives a: Questionable prediction of patient's progress: MODERATE COMPLEXITY                 TREATMENT:   (In addition to Assessment/Re-Assessment sessions the following treatments were rendered)   Pre-treatment Symptoms/Complaints:    Pain: Initial:   Pain Intensity 1: 8  Pain Location 1: Hip, Shoulder  Pain Orientation 1: Right  Pain Intervention(s) 1: Emotional support, Position, Rest, Repositioned  Post Session:  Endorses comfort in bedside chair. Initial Evaluation  Therapeutic Activity: (    8 Minutes):   Therapeutic activities including bed mobility, transfer training, balance training, safety awareness training, and patient education to improve mobility, strength, balance and coordination. Required maximal   to promote static and dynamic balance in standing and promote coordination of bilateral, lower extremity(s). Braces/Orthotics/Lines/Etc:   · IV  · O2 Device: Nasal cannula  Treatment/Session Assessment:    · Response to Treatment:  See above. · Interdisciplinary Collaboration:  · Physical Therapist  · Occupational Therapist  · Registered Nurse  · After treatment position/precautions:  · Up in chair  · Bed alarm/tab alert on  · Bed/Chair-wheels locked  · Bed in low position  · Call light within reach  · RN notified  · Compliance with Program/Exercises: Will assess as treatment progresses. · Recommendations/Intent for next treatment session: \"Next visit will focus on advancements to more challenging activities and reduction in assistance provided\".      Total Treatment Duration:  PT Patient Time In/Time Out  Time In: 1015  Time Out: 15101 Yaneth Tian [facility]

## 2017-08-16 NOTE — PROGRESS NOTES
LTG: Patient will tolerate least restrictive diet without overt signs or symptoms of airway compromise. STG: Patient will tolerate mechanical soft with chopped meats and honey thick liquids without overt signs or symptoms of airway compromise. STG: Patient will participate in modified barium swallow study as clinically indicated  Speech language pathology: modified barium swallow study: Initial Assessment    NAME/AGE/GENDER: Julieann Schwab is a 80 y.o. male  DATE: 8/16/2017  PRIMARY DIAGNOSIS: Intertrochanteric fracture of right femur, closed, [facility] [S72.141A]  Procedure(s) (LRB):  RIGHT FEMUR INSERTION INTRA MEDULLARY NAIL (Right) 1 Day Post-Op  ICD-10: Treatment Diagnosis: R13.12 Oropharyngeal Dysphagia. INTERDISCIPLINARY COLLABORATION: Registered Nurse  PRECAUTIONS/ALLERGIES: Review of patient's allergies indicates no known allergies. ASSESSMENT/PLAN OF CARE:Based on the objective data described below, Mr. Livan Astorga presents with moderate oropharyngeal dysphagia. Decreased swallow initiation noted throughout study with all consistencies, resulting in premature spillage to vallecula and pyriform sinus. Silent aspiration of thin liquids via during the swallow. Penetration observed with nectar sip via cup along with moderate pharyngeal residue. Subsequent deep penetration to the level of the cords on nectar residue after the swallow. Moderate pharyngeal residue also noted with honey thick liquids via straw and with puree; however no evidence of penetration or aspiration observed. Patient benefited from double swallow, which was effective in clearing residue from vallecula and pyriforms. Increased mastication time and delayed swallow initiation with soft solid trial. No penetration or aspiration observed. Solid trials deferred. Recommend MECHANICAL SOFT diet with chopped meats and vegetables. HONEY thick liquids. Medications crushed in puree.  Slow rate of intake and alternating liquids and solids, Verbal prompts for double swallow during meals. He will likely required 1:1 assist with feeding as patient will be unable to adhere to swallowing precautions independently. Results and recommendations communicated to patient's wife and family friend at conclusion of evaluation. Patient will benefit from skilled intervention to address the below impairments. ?????? ? ? This section established at most recent assessment??????????  RECOMMENDATIONS AND PLANNED INTERVENTIONS (Benefits and precautions of therapy have been discussed with the patient.):  · PO:  Mechanical soft with chopped meat and vegetables  · Liquids:  honey  MEDICATIONS:  · Crushed in puree  COMPENSATORY STRATEGIES/MODIFICATIONS INCLUDING:  · Alternate liquids/solids  · Double swallow  · Small sips and bites  OTHER RECOMMENDATIONS (including follow up treatment recommendations): · Family training/education  · Patient education  FREQUENCY/DURATION: Continue to follow patient 3 times a week for duration of hospital stay to address above goals. RECOMMENDED REHABILITATION/EQUIPMENT: (at time of discharge pending progress):   Continue Skilled Therapy and Rehab. SUBJECTIVE:   Patient transported to radiology for assessment. Cooperative and agreeable to study. History of Present Injury/Illness: Mr. Jonathon Chong  has a past medical history of Alcohol abuse; AVM (arteriovenous malformation) of colon; Diabetes ([address] Hypertension; Liver disease; Macular degeneration; Other ill-defined conditions; Portal hypertension ([address] Psychiatric disorder; PUD (peptic ulcer disease); Thrombocytopenia ([address] and Varices, esophageal ([address] .  He also  has a past surgical history that includes orthopaedic (1999) and other surgical.   Present Symptoms: Coughing with liquids, history of aspiration   Pain Intensity 1: 0  Pain Location 1: Leg, Shoulder  Pain Orientation 1: Right  Pain Intervention(s) 1: Medication (see MAR)  Pain Intensity 2: 0  Pain Intensity 3: 0  Current Dietary Status:  Mechanical soft with chopped meats and vegetables/ honey thick liquids   Radiologist: [employer]  Social History/Home Situation:    Home Environment: Private residence  One/Two Story Residence: One story  Living Alone: No  Support Systems: Spouse/Significant Other/Partner  Patient Expects to be Discharged to[de-identified] Unknown  Current DME Used/Available at Home: Cane, straight, Walker  Tub or Shower Type: Shower  OBJECTIVE:   Communication Status:  Mental Status  Neurologic State: Alert, Confused  Orientation Level: Oriented to place  Cognition: Decreased attention/concentration, Impaired decision making  Perception: Appears intact  Perseveration: Perseverates during conversation  Safety/Judgement: Decreased awareness of environment, Decreased awareness of need for assistance    Oral Assessment:  Oral Assessment  Labial: Decreased rate  Oral Hygiene: Adequate  Lingual: Decreased rate    Vocal Quality: Clear    Patient Viewed: Patient Position: upright in chair  Film Views: Lateral, Fluoro    Oral Prepatory:  The patient was given the following: Consistency Presented: Pudding, Thin liquid, Nectar thick liquid, Mixed consistency, Honey thick liquid  How Presented: SLP-fed/presented, Cup/sip, Spoon, Straw    Oral Phase:  Bolus Acceptance: No impairment  Bolus Formation/Control: Impaired  Propulsion: Delayed (# of seconds)  Type of Impairment: Drooling, Premature spillage, Mastication, Piecemeal     Initiation of Swallow: Triggered at vallecula, Triggered at pyriform sinus(es)  Oral Phase Severity: Mild-moderate    Pharyngeal Phase:  Timing: Pooling 1-5 sec  Decreased Tongue Base Retraction?: Yes  Laryngeal Elevation: Inadequate epiglottic inversion, Incomplete laryngeal closure  Penetration: During swallow, After swallow, To cords, From residual, From initial swallow  Aspiration/Timing: Silent , During  Aspiration/Penetration Score: 8 (Aspiration-Contrast passes cords/glottis with no effort to eject, ie/silent aspiration)  Pharyngeal Symmetry: Not assessed  Pharyngeal Dysfunction: Decreased elevation/closure, Decreased tongue base retraction, Decreased strength  Pharyngeal Phase Severity: Moderately severe  Pharyngeal-Esophageal Segment: No impairment    Assessment/Reassessment only, no treatment provided today    Tool Used: Dysphagia Outcome and Severity Scale (GUSTAVO)    Score Comments   Normal Diet  [] 7 With no strategies or extra time needed       Functional Swallow  [] 6 May have mild oral or pharyngeal delay       Mild Dysphagia    [] 5 Which may require one diet consistency restricted (those who demonstrate penetration which is entirely cleared on MBS would be included)   Mild-Moderate Dysphagia  [] 4 With 1-2 diet consistencies restricted       Moderate Dysphagia  [x] 3 With 2 or more diet consistencies restricted       Moderately Severe Dysphagia  [] 2 With partial PO strategies (trials with ST only)       Severe Dysphagia  [] 1 With inability to tolerate any PO safely          Score:  Initial: 3 Most Recent: X (Date: -- )   Interpretation of Tool: The Dysphagia Outcome and Severity Scale (GUSTAVO) is a simple, easy-to-use, 7-point scale developed to systematically rate the functional severity of dysphagia based on objective assessment and make recommendations for diet level, independence level, and type of nutrition. Score 7 6 5 4 3 2 1   Modifier CH CI CJ CK CL CM CN   ?  Swallowing:     - CURRENT STATUS: CL - 60%-79% impaired, limited or restricted    - GOAL STATUS:  CK - 40%-59% impaired, limited or restricted    - D/C STATUS:  ---------------To be determined---------------  [place] / Product Type: Medicare /   __________________________________________________________________________________________________  Safety:   After treatment position/precautions:  · patient left in radiology holding bay, awaiting transport back to room  Recommendations for treatment: MECHANICAL SOFT with chopped meats and vegetables/ HONEY thick liquids. ST to follow regarding diet tolerance, family training,  and adherence to swallow recommendations.    Total Treatment Duration:  Time In: 1330   Time Out: 1400    DIONISIO Infante, CCC-SLP, CBIS

## 2017-08-16 NOTE — PROGRESS NOTES
Care Management Interventions  Mode of Transport at Discharge: BLS  Transition of Care Consult (CM Consult): Discharge Planning  Discharge Durable Medical Equipment: No  Physical Therapy Consult: Yes  Occupational Therapy Consult: Yes  Speech Therapy Consult: Yes  Current Support Network: Own Home, Lives with Spouse, Family Lives Nearby  Confirm Follow Up Transport: Family  Plan discussed with Pt/Family/Caregiver: Yes  Freedom of Choice Offered: Yes  Discharge Location  Discharge Placement: Rehab Unit Subacute    Pt is an 81 yo male admitted for surgical repair of a hip fracture he sustained in a fall. Pt would benefit from STR services at discharge. SW met with pt/spouse/family friend to discuss choice of subacute facility. From a list of local facilities they requested referrals to, in order of preference, [facility], [facility] and [facility] Referrals submitted requesting a bed for Thursday. Awaiting responses. PPD placed 8/14/17. SW following to facilitate pt's transfer to rehab at discharge.

## 2017-08-16 NOTE — PROGRESS NOTES
ORTH FRACTURE PROGRESS NOTE    2017  Admit Date:   2017    Post Op day: 1 Day Post-Op    Subjective:    Alcus Boop SITTING UP IN CHAIR     PT/OT:   Gait:                    Vital Signs:    Patient Vitals for the past 8 hrs:   BP Temp Pulse Resp SpO2   17 0729 99/54 98.3 °F (36.8 °C) 83 16 96 %   17 0555 93/54 97.9 °F (36.6 °C) 89 16 100 %     Temp (24hrs), Av.8 °F (36.6 °C), Min:97.3 °F (36.3 °C), Max:98.3 °F (36.8 °C)      Pain Control:   Pain Assessment  Pain Scale 1: Numeric (0 - 10)  Pain Intensity 1: 5  Pain Onset 1: post op  Pain Location 1: Leg, Shoulder  Pain Orientation 1: Right  Pain Description 1: Aching, Sore  Pain Intervention(s) 1: Rest, Repositioned    Meds:    Current Facility-Administered Medications   Medication Dose Route Frequency    lidocaine (XYLOCAINE) 10 mg/mL (1 %) injection 0.1 mL  0.1 mL SubCUTAneous PRN    lactated Ringers infusion  75 mL/hr IntraVENous CONTINUOUS    READ PPD  1 Each Other Q24H    lactated Ringers infusion  75 mL/hr IntraVENous CONTINUOUS    sodium chloride (NS) flush 5-10 mL  5-10 mL IntraVENous Q8H    sodium chloride (NS) flush 5-10 mL  5-10 mL IntraVENous PRN    acetaminophen (TYLENOL) tablet 650 mg  650 mg Oral Q8H    oxyCODONE IR (ROXICODONE) tablet 5 mg  5 mg Oral Q4H PRN    ondansetron (ZOFRAN) injection 4 mg  4 mg IntraVENous Q4H PRN    docusate sodium (COLACE) capsule 100 mg  100 mg Oral BID    alum-mag hydroxide-simeth (MYLANTA) oral suspension 30 mL  30 mL Oral Q4H PRN    calcium-vitamin D (OS-KIMBERLEY) 500 mg-200 unit tablet  1 Tab Oral TID WITH MEALS    enoxaparin (LOVENOX) injection 30 mg  30 mg SubCUTAneous Q24H    allopurinol (ZYLOPRIM) tablet 100 mg  100 mg Oral DAILY    ALPRAZolam (XANAX) tablet 2 mg  2 mg Oral BID    cyanocobalamin tablet 1,000 mcg  1,000 mcg Oral DAILY    calcium-vitamin D (OSCAL 250) tablet 1 Tab  1 Tab Oral BID WITH MEALS    ferrous sulfate tablet 325 mg  1 Tab Oral BID WITH MEALS    lactulose (CHRONULAC) solution 10 g  10 g Oral DAILY    thiamine (B-1) tablet 100 mg  100 mg Oral DAILY    pantoprazole (PROTONIX) injection 40 mg  40 mg IntraVENous Q12H    insulin lispro (HUMALOG) injection   SubCUTAneous TIDAC    dextrose 5% and 0.9% NaCl infusion  75 mL/hr IntraVENous CONTINUOUS    traMADol (ULTRAM) tablet 50 mg  50 mg Oral Q6H PRN    oxyCODONE IR (ROXICODONE) tablet 5 mg  5 mg Oral Q4H PRN    lactated Ringers infusion  75 mL/hr IntraVENous CONTINUOUS       LAB:    Recent Labs      08/16/17   0435  08/15/17   1148   HCT  23.6*   --    HGB  8.2*   --    INR   --   1.2       24 Hour Assessment Issues:    Disoriented (baseline)    Discharge Planning: SNF    Transfuse PRBC's:      Assessment & Physician's Comment:  Dressing is clean, dry, and intact  Neurovascular checks within normal limits    Active Problems:    Fracture closed, femur, shaft ([facility] (8/14/2017)      Fracture (8/14/2017)        Plan:  CONTINUE THERAPY   WBAT   SNF FOR REHAB    Maci Reed NP

## 2017-08-17 LAB
ANION GAP SERPL CALC-SCNC: 10 MMOL/L (ref 7–16)
BASOPHILS # BLD: 0 K/UL (ref 0–0.2)
BASOPHILS NFR BLD: 0 % (ref 0–2)
BUN SERPL-MCNC: 28 MG/DL (ref 8–23)
CALCIUM SERPL-MCNC: 8.3 MG/DL (ref 8.3–10.4)
CHLORIDE SERPL-SCNC: 106 MMOL/L (ref 98–107)
CO2 SERPL-SCNC: 23 MMOL/L (ref 21–32)
CREAT SERPL-MCNC: 1.13 MG/DL (ref 0.8–1.5)
DIFFERENTIAL METHOD BLD: ABNORMAL
EOSINOPHIL # BLD: 0.2 K/UL (ref 0–0.8)
EOSINOPHIL NFR BLD: 5 % (ref 0.5–7.8)
ERYTHROCYTE [DISTWIDTH] IN BLOOD BY AUTOMATED COUNT: 15.5 % (ref 11.9–14.6)
GLUCOSE BLD STRIP.AUTO-MCNC: 181 MG/DL (ref 65–100)
GLUCOSE BLD STRIP.AUTO-MCNC: 221 MG/DL (ref 65–100)
GLUCOSE BLD STRIP.AUTO-MCNC: 230 MG/DL (ref 65–100)
GLUCOSE BLD STRIP.AUTO-MCNC: 279 MG/DL (ref 65–100)
GLUCOSE SERPL-MCNC: 158 MG/DL (ref 65–100)
HCT VFR BLD AUTO: 21.4 % (ref 41.1–50.3)
HGB BLD-MCNC: 7.4 G/DL (ref 13.6–17.2)
IMM GRANULOCYTES # BLD: 0 K/UL (ref 0–0.5)
IMM GRANULOCYTES NFR BLD: 0.3 % (ref 0–5)
LYMPHOCYTES # BLD: 0.4 K/UL (ref 0.5–4.6)
LYMPHOCYTES NFR BLD: 9 % (ref 13–44)
MCH RBC QN AUTO: 30 PG (ref 26.1–32.9)
MCHC RBC AUTO-ENTMCNC: 34.6 G/DL (ref 31.4–35)
MCV RBC AUTO: 86.6 FL (ref 79.6–97.8)
MM INDURATION POC: 0 MM (ref 0–5)
MONOCYTES # BLD: 0.4 K/UL (ref 0.1–1.3)
MONOCYTES NFR BLD: 9 % (ref 4–12)
NEUTS SEG # BLD: 3 K/UL (ref 1.7–8.2)
NEUTS SEG NFR BLD: 77 % (ref 43–78)
PLATELET # BLD AUTO: 49 K/UL (ref 150–450)
PMV BLD AUTO: 11.6 FL (ref 10.8–14.1)
POTASSIUM SERPL-SCNC: 4.5 MMOL/L (ref 3.5–5.1)
PPD POC: NEGATIVE NEGATIVE
RBC # BLD AUTO: 2.47 M/UL (ref 4.23–5.67)
SODIUM SERPL-SCNC: 139 MMOL/L (ref 136–145)
WBC # BLD AUTO: 3.9 K/UL (ref 4.3–11.1)

## 2017-08-17 PROCEDURE — 97110 THERAPEUTIC EXERCISES: CPT

## 2017-08-17 PROCEDURE — 74011250636 HC RX REV CODE- 250/636: Performed by: HOSPITALIST

## 2017-08-17 PROCEDURE — 74011250637 HC RX REV CODE- 250/637: Performed by: HOSPITALIST

## 2017-08-17 PROCEDURE — 92526 ORAL FUNCTION THERAPY: CPT

## 2017-08-17 PROCEDURE — 74011250636 HC RX REV CODE- 250/636: Performed by: NURSE PRACTITIONER

## 2017-08-17 PROCEDURE — 30233N1 TRANSFUSION OF NONAUTOLOGOUS RED BLOOD CELLS INTO PERIPHERAL VEIN, PERCUTANEOUS APPROACH: ICD-10-PCS | Performed by: HOSPITALIST

## 2017-08-17 PROCEDURE — 80048 BASIC METABOLIC PNL TOTAL CA: CPT | Performed by: HOSPITALIST

## 2017-08-17 PROCEDURE — C9113 INJ PANTOPRAZOLE SODIUM, VIA: HCPCS | Performed by: HOSPITALIST

## 2017-08-17 PROCEDURE — 74011636637 HC RX REV CODE- 636/637: Performed by: HOSPITALIST

## 2017-08-17 PROCEDURE — 97530 THERAPEUTIC ACTIVITIES: CPT

## 2017-08-17 PROCEDURE — 65270000029 HC RM PRIVATE

## 2017-08-17 PROCEDURE — 36415 COLL VENOUS BLD VENIPUNCTURE: CPT | Performed by: NURSE PRACTITIONER

## 2017-08-17 PROCEDURE — 74011250637 HC RX REV CODE- 250/637: Performed by: NURSE PRACTITIONER

## 2017-08-17 PROCEDURE — P9016 RBC LEUKOCYTES REDUCED: HCPCS | Performed by: HOSPITALIST

## 2017-08-17 PROCEDURE — 82962 GLUCOSE BLOOD TEST: CPT

## 2017-08-17 PROCEDURE — 74011250637 HC RX REV CODE- 250/637: Performed by: ORTHOPAEDIC SURGERY

## 2017-08-17 PROCEDURE — 36430 TRANSFUSION BLD/BLD COMPNT: CPT

## 2017-08-17 PROCEDURE — 85025 COMPLETE CBC W/AUTO DIFF WBC: CPT | Performed by: NURSE PRACTITIONER

## 2017-08-17 RX ORDER — SODIUM CHLORIDE 9 MG/ML
250 INJECTION, SOLUTION INTRAVENOUS AS NEEDED
Status: DISCONTINUED | OUTPATIENT
Start: 2017-08-17 | End: 2017-08-17

## 2017-08-17 RX ADMIN — Medication 10 ML: at 22:29

## 2017-08-17 RX ADMIN — LACTULOSE 10 G: 20 SOLUTION ORAL at 09:48

## 2017-08-17 RX ADMIN — TRAMADOL HYDROCHLORIDE 50 MG: 50 TABLET, FILM COATED ORAL at 22:28

## 2017-08-17 RX ADMIN — PANTOPRAZOLE SODIUM 40 MG: 40 INJECTION, POWDER, FOR SOLUTION INTRAVENOUS at 09:48

## 2017-08-17 RX ADMIN — ACETAMINOPHEN 650 MG: 325 TABLET, FILM COATED ORAL at 06:02

## 2017-08-17 RX ADMIN — INSULIN LISPRO 4 UNITS: 100 INJECTION, SOLUTION INTRAVENOUS; SUBCUTANEOUS at 16:52

## 2017-08-17 RX ADMIN — CYANOCOBALAMIN TAB 1000 MCG 1000 MCG: 1000 TAB at 09:47

## 2017-08-17 RX ADMIN — ALPRAZOLAM 2 MG: 0.5 TABLET ORAL at 16:51

## 2017-08-17 RX ADMIN — ALLOPURINOL 100 MG: 100 TABLET ORAL at 09:47

## 2017-08-17 RX ADMIN — CALCIUM CARBONATE-CHOLECALCIFEROL TAB 250 MG-125 UNIT 1 TABLET: 250-125 TAB at 09:47

## 2017-08-17 RX ADMIN — ALPRAZOLAM 2 MG: 0.5 TABLET ORAL at 09:47

## 2017-08-17 RX ADMIN — TRAMADOL HYDROCHLORIDE 50 MG: 50 TABLET, FILM COATED ORAL at 09:46

## 2017-08-17 RX ADMIN — Medication 5 ML: at 16:58

## 2017-08-17 RX ADMIN — FERROUS SULFATE TAB 325 MG (65 MG ELEMENTAL FE) 325 MG: 325 (65 FE) TAB at 16:52

## 2017-08-17 RX ADMIN — CALCIUM CARBONATE 500 MG (1,250 MG)-VITAMIN D3 200 UNIT TABLET 1 TABLET: at 12:07

## 2017-08-17 RX ADMIN — ACETAMINOPHEN 650 MG: 325 TABLET, FILM COATED ORAL at 16:52

## 2017-08-17 RX ADMIN — INSULIN LISPRO 6 UNITS: 100 INJECTION, SOLUTION INTRAVENOUS; SUBCUTANEOUS at 12:07

## 2017-08-17 RX ADMIN — FERROUS SULFATE TAB 325 MG (65 MG ELEMENTAL FE) 325 MG: 325 (65 FE) TAB at 09:48

## 2017-08-17 RX ADMIN — Medication 10 ML: at 06:02

## 2017-08-17 RX ADMIN — SODIUM CHLORIDE 500 ML: 900 INJECTION, SOLUTION INTRAVENOUS at 09:00

## 2017-08-17 RX ADMIN — Medication 100 MG: at 09:47

## 2017-08-17 RX ADMIN — ACETAMINOPHEN 650 MG: 325 TABLET, FILM COATED ORAL at 22:28

## 2017-08-17 RX ADMIN — CALCIUM CARBONATE 500 MG (1,250 MG)-VITAMIN D3 200 UNIT TABLET 1 TABLET: at 16:52

## 2017-08-17 RX ADMIN — ENOXAPARIN SODIUM 30 MG: 30 INJECTION SUBCUTANEOUS at 16:52

## 2017-08-17 RX ADMIN — RIFAXIMIN 550 MG: 550 TABLET ORAL at 12:07

## 2017-08-17 RX ADMIN — CALCIUM CARBONATE-CHOLECALCIFEROL TAB 250 MG-125 UNIT 1 TABLET: 250-125 TAB at 16:52

## 2017-08-17 RX ADMIN — RIFAXIMIN 550 MG: 550 TABLET ORAL at 16:52

## 2017-08-17 RX ADMIN — CALCIUM CARBONATE 500 MG (1,250 MG)-VITAMIN D3 200 UNIT TABLET 1 TABLET: at 09:48

## 2017-08-17 RX ADMIN — INSULIN LISPRO 4 UNITS: 100 INJECTION, SOLUTION INTRAVENOUS; SUBCUTANEOUS at 09:59

## 2017-08-17 RX ADMIN — DOCUSATE SODIUM 100 MG: 100 CAPSULE, LIQUID FILLED ORAL at 09:48

## 2017-08-17 NOTE — PROGRESS NOTES
LTG: Patient will tolerate least restrictive diet without overt signs or symptoms of airway compromise. STG: Patient will tolerate mechanical soft with chopped meats and honey thick liquids without overt signs or symptoms of airway compromise. STG: Patient will participate in modified barium swallow study as clinically indicated. Speech language pathology: bedside swallow note: Daily Note 2    NAME/AGE/GENDER: Magda Antonio is a 80 y.o. male  DATE: 8/17/2017  PRIMARY DIAGNOSIS: Intertrochanteric fracture of right femur, closed, initial encounter ([facility] [S72.141A]  Procedure(s) (LRB):  RIGHT FEMUR INSERTION INTRA MEDULLARY NAIL (Right) 2 Days Post-Op  ICD-10: Treatment Diagnosis: R13.12 Oropharyngeal Dysphagia. INTERDISCIPLINARY COLLABORATION: Registered Nurse  PRECAUTIONS/ALLERGIES: Review of patient's allergies indicates no known allergies. ASSESSMENT:Patient seen for diet tolerance this AM. Modified barium swallow study completed on 8/17/17 with recommendations of mechanical soft with chopped meats and vegetables and honey thick liquids. Upon arrival to room, patient noted to have honey thick liquid sitting at bedside; however, wife expressing concern that liquid is \"too thick\". Clinician provided education on need for thickened liquids and appropriate consistency. Wife also reports providing liquids when patient is laying supine. Education provided regarding importance of upright seating with po intake,cues for double swallows, need to stay upright for 10-15 minutes after meal, and need for assistance with meals due impulsivity. Patient provided with honey thick liquids via straw and soft solid consistency. Patient impulsive with honey thick liquids, consuming consecutive sips via straw. He required removal of straw by ST to reduce rate. Single swallow palpated, requiring moderate prompting to perform double swallow. Extended time required for mastication of soft solids, but adequate oral clearing.  No overt signs or symptoms of airway compromise noted. Recommend MECHANICAL SOFT DIET with chopped meats and vegetables. HONEY thick liquids. Medications crushed in puree. Upright seating with intake, cued double swallows, 1:1 assist with feedings. Patient/family would benefit from continued training regarding diet recommendations, and swallowing precautions. ?????? ? ? This section established at most recent assessment??????????  PROBLEM LIST (Impairments causing functional limitations):  1. Oropharyngeal dysphagia  REHABILITATION POTENTIAL FOR STATED GOALS: Fair  PLAN OF CARE:   Patient will benefit from skilled intervention to address the following impairments. RECOMMENDATIONS AND PLANNED INTERVENTIONS (Benefits and precautions of therapy have been discussed with the patient.):  · PO:  Mechanical soft with chopped meat and vegetables  · Liquids:  honey  MEDICATIONS:  · Crushed in puree  COMPENSATORY STRATEGIES/MODIFICATIONS INCLUDING:  · Cup/sip  · Small sips and bites  OTHER RECOMMENDATIONS (including follow up treatment recommendations): · Family training/education  · Patient education  RECOMMENDED DIET MODIFICATIONS DISCUSSED WITH:  · Nursing  · Family  · Patient  FREQUENCY/DURATION: Continue to follow patient 3 times a week for duration of hospital stay to address above goals. RECOMMENDED REHABILITATION/EQUIPMENT: (at time of discharge pending progress):   Continue Skilled Therapy; Rehab for patient/family training. SUBJECTIVE:   Alert, participated well with clinician. Wife and family friend at bedside. History of Present Injury/Illness: Mr. Jonathon Chong  has a past medical history of Alcohol abuse; AVM (arteriovenous malformation) of colon; Diabetes ([address] Hypertension; Liver disease; Macular degeneration; Other ill-defined conditions; Portal hypertension ([address] Psychiatric disorder; PUD (peptic ulcer disease); Thrombocytopenia ([address] and Varices, esophageal ([address] .  He also  has a past surgical history that includes orthopaedic (1999) and other surgical.   Present Symptoms: Coughing with liquids; dysphagia   Pain Intensity 1: 0  Pain Location 1: Leg, Shoulder  Pain Orientation 1: Right  Pain Intervention(s) 1: Medication (see MAR)  Pain Intensity 2: 0  Pain Intensity 3: 0  Current Medications:   No current facility-administered medications on file prior to encounter. Current Outpatient Prescriptions on File Prior to Encounter   Medication Sig Dispense Refill    ferrous sulfate 325 mg (65 mg iron) cpER Take  by mouth.  levofloxacin (LEVAQUIN) 750 mg tablet Take 1 Tab by mouth daily. 10 Tab 0    lactulose (CHRONULAC) 10 gram/15 mL solution Take 10 g by mouth daily.  glipiZIDE (GLUCOTROL) 5 mg tablet Take 5 mg by mouth Daily (before breakfast).  cyanocobalamin (VITAMIN B-12) 1,000 mcg tablet Take 1,000 mcg by mouth daily.  Calcium-Cholecalciferol, D3, (CALCIUM 600 WITH VITAMIN D3) 600 mg(1,500mg) -400 unit cap Take 1 Cap by mouth daily. Indications: HYPOCALCEMIA      OTHER,NON-FORMULARY, Preser-vision takes BID      ALPRAZolam (XANAX) 2 mg tablet Take 2 mg by mouth two (2) times a day.  allopurinol (ZYLOPRIM) 100 mg tablet Take 100 mg by mouth daily.  multivitamin (ONE A DAY) tablet Take 1 Tab by mouth daily.  metFORMIN (GLUCOPHAGE) 500 mg tablet Take 500 mg by mouth two (2) times daily (with meals).  thiamine (B-1) 100 mg tablet Take 100 mg by mouth daily.  omeprazole (PRILOSEC) 40 mg capsule Take 40 mg by mouth daily.          Current Dietary Status:  Per wife, mechanical soft with nectar thick liquids   Radiologist: [employer]  Social History/Home Situation:      Home Environment: Private residence  One/Two Story Residence: One story  Living Alone: No  Support Systems: Spouse/Significant Other/Partner  Patient Expects to be Discharged to[de-identified] Unknown  Current DME Used/Available at Home: Cane, straight, Walker  Tub or Shower Type: Shower  OBJECTIVE:   Respiratory Status:        CXR Results:Minimal scarring/atelectasis in the left lung base  MRI/CT Results:No acute CT findings the brain. Chronic changes as described above  Oral Motor Structure/Speech:  Oral-Motor Structure/Motor Speech  Labial: Decreased rate  Dentition: Limited  Oral Hygiene: Adequate  Lingual: Decreased rate    Cognitive and Communication Status:  Neurologic State: Alert  Orientation Level: Oriented to person  Cognition: Decreased attention/concentration  Perception: Appears intact  Perseveration: Perseverates during conversation  Safety/Judgement: Decreased awareness of need for assistance    BEDSIDE SWALLOW EVALUATION  Oral Assessment:  Oral Assessment  Labial: Decreased rate  Dentition: Limited  Oral Hygiene: Adequate  Lingual: Decreased rate  P.O. Trials:  Patient Position: Upright in bed    The patient was given tsp-small bite amounts of the following:   Consistency Presented: Honey thick liquid;Mixed consistency  How Presented: Cup/sip    ORAL PHASE:  Bolus Acceptance: No impairment  Bolus Formation/Control: Impaired  Propulsion: Delayed (# of seconds)  Type of Impairment: Mastication;Delayed  Oral Residue: None    PHARYNGEAL PHASE:  Initiation of Swallow: Delayed (# of seconds)  Laryngeal Elevation: Decreased  Aspiration Signs/Symptoms: None  Vocal Quality: No impairment     Effective Modifications: Small sips and bites; Alternate liquids/solids     Pharyngeal Phase Characteristics: Double swallow    OTHER OBSERVATIONS:  Rate/bite size: Impaired   Endurance:  Impaired   Comments:       Tool Used: Dysphagia Outcome and Severity Scale (GUSTAVO)    Score Comments   Normal Diet  [] 7 With no strategies or extra time needed   Functional Swallow  [] 6 May have mild oral or pharyngeal delay       Mild Dysphagia    [] 5 Which may require one diet consistency restricted (those who demonstrate penetration which is entirely cleared on MBS would be included)   Mild-Moderate Dysphagia  [] 4 With 1-2 diet consistencies restricted       Moderate Dysphagia  [x] 3 With 2 or more diet consistencies restricted       Moderately Severe Dysphagia  [] 2 With partial PO strategies (trials with ST only)       Severe Dysphagia  [] 1 With inability to tolerate any PO safely          Score:  Initial: 7 Most Recent: X (Date: -- )   Interpretation of Tool: The Dysphagia Outcome and Severity Scale (GUSTAVO) is a simple, easy-to-use, 7-point scale developed to systematically rate the functional severity of dysphagia based on objective assessment and make recommendations for diet level, independence level, and type of nutrition. Score 7 6 5 4 3 2 1   Modifier CH CI CJ CK CL CM CN   ? Swallowing:     - CURRENT STATUS: CL - 60%-79% impaired, limited or restricted    - GOAL STATUS:  CK - 40%-59% impaired, limited or restricted    - D/C STATUS:  ---------------To be determined---------------  [place] / Product Type: Medicare /     TREATMENT:    (In addition to Assessment/Re-Assessment sessions the following treatments were rendered)  Assessment/Reassessment only, no treatment provided today  MODALITIES:                                                                    ORAL MOTOR  EXERCISES:                                                                                                                                                                      LARYNGEAL / PHARYNGEAL EXERCISES:                                                                                                                                     __________________________________________________________________________________________________  Safety:   After treatment position/precautions:  · RN notified  · Upright in Bed  Progression/Medical Necessity:   · Patient is expected to demonstrate progress in swallow function to decrease aspiration risk. Compliance with Program/Exercises: Will assess as treatment progresses. Reason for Continuation of Services/Other Comments:  · Patient continues to require skilled intervention due to Dysphagia. Recommendations/Intent for next treatment session: \"Treatment next visit will focus on diet tolerance. \".     Total Treatment Duration:  Time In: 1027  Time Out: 1059    DIONISIO Infante, CCC-SLP, CBIS

## 2017-08-17 NOTE — PROGRESS NOTES
Pt has been accepted for admission to [facility] and can transfer there today if he is medically cleared for discharge. 1128:  Pt/family are aware of bed offer and plan and are in agreement. Pt is not medically ready for discharge today though. MADELYN has notified facility and the bed will still be available tomorrow if the pt is cleared for dc. MADELYN following. 6889:  [address] had a bed to open up for tomorrow. MADELYN spoke with pt's family friend (MADELYN could not reach the spouse) to notify her of the bed offer and she agreed to accept the bed offer. MADELYN notified [facility] that bed no longer needed at their facility. MADELYN following to facilitate pt's transfer to rehab at discharge.

## 2017-08-17 NOTE — PROGRESS NOTES
Problem: Mobility Impaired (Adult and Pediatric)  Goal: *Acute Goals and Plan of Care (Insert Text)  STG:  (1.)Mr. Mcihi Dumont will perform bed mobility with MODERATE ASSIST within 3 day(s). (2.)Mr. Michi Dumont will transfer from bed to chair and chair to bed with MODERATE ASSIST using the least restrictive device within 3 day(s). (3.)Mr. Michi Dumont will ambulate with MODERATE ASSIST for 10+ feet with the least restrictive device within 3 day(s). (4.)Mr. Michi Dumont will tolerate 15+ minutes of therapeutic activity/exercise while maintaining stable vitals to improve functional strength and endurance within 3 day(s). LTG:  (1.)Mr. Michi Dumont will perform bed mobility with MINIMAL ASSIST within 7 day(s). (2.)Mr. Michi Dumont will transfer from bed to chair and chair to bed with MINIMAL ASSIST using the least restrictive device within 7 day(s). (3.)Mr. Michi Dumont will ambulate with MINIMAL ASSIST for 25+ feet with the least restrictive device within 7 day(s). (4.)Mr. Michi Dumont will tolerate 25+ minutes of therapeutic activity/exercise while maintaining stable vitals to improve functional strength and endurance within 7 day(s).   ________________________________________________________________________________________________      PHYSICAL THERAPY: Daily Note, Treatment Day: 1st and AM    8/17/2017  INPATIENT: Hospital Day: 4  [place] / Product Type: Medicare /       Ale POLK     NAME/AGE/GENDER: Love Malloy is a 80 y.o. male        PRIMARY DIAGNOSIS: Intertrochanteric fracture of right femur, closed, initial encounter ([facility] Jerman Aggarwal <principal problem not specified> <principal problem not specified>  Procedure(s) (LRB):  RIGHT FEMUR INSERTION INTRA MEDULLARY NAIL (Right)  2 Days Post-Op  ICD-10: Treatment Diagnosis:       · Generalized Muscle Weakness (M62.81)  · Difficulty in walking, Not elsewhere classified (R26.2)  · History of falling (Z91.81)   Precaution/Allergies:  Review of patient's allergies indicates no known allergies. ASSESSMENT:      Mr. Clayborne Boast is a 80year old male 1 day s/p right femoral IM nailing and is WBAT R LE. Patient lives with his spouse in a single story residence with steps to enter. At baseline, patient has a history significant for Parkinson's Disease as well as being legally blind and hard of hearing per chart. Mr. Clayborne Boast endorses frequent falls at home, requires assistance with ADLs from spouse, and ambulates household distances with a SPC. This morning, pt with low BP and receiving a bolus and a transfusion. Discussed with RN to get pt out of bed after lunch. Pt was supine and performed below LE exercises. Pt was able to follow along with therapist as the exercises were started. Pt was left supine with family present. improved command following today with exercises. Will continue with POC. This section established at most recent assessment   PROBLEM LIST (Impairments causing functional limitations):  1. Decreased Strength  2. Decreased ADL/Functional Activities  3. Decreased Transfer Abilities  4. Decreased Ambulation Ability/Technique  5. Decreased Balance  6. Increased Pain  7. Decreased Activity Tolerance  8. Increased Shortness of Breath  9. Decreased Flexibility/Joint Mobility  10. Decreased Knowledge of Precautions  11. Decreased [place] with Home Exercise Program  12. Decreased Cognition    INTERVENTIONS PLANNED: (Benefits and precautions of physical therapy have been discussed with the patient.)  1. Balance Exercise  2. Bed Mobility  3. Family Education  4. Gait Training  5. Home Exercise Program (HEP)  6. Range of Motion (ROM)  7. Therapeutic Activites  8. Ultrasound (US)  9. Group Therapy      TREATMENT PLAN: Frequency/Duration: twice daily for duration of hospital stay  Rehabilitation Potential For Stated Goals: Bernice Iraheta REHABILITATION/EQUIPMENT: (at time of discharge pending progress): Continue Skilled Therapy. HISTORY:   History of Present Injury/Illness (Reason for Referral):  S/p right femoral IM nailing  Past Medical History/Comorbidities:   Mr. Jerman Osorio  has a past medical history of Alcohol abuse; AVM (arteriovenous malformation) of colon; Diabetes ([address] Hypertension; Liver disease; Macular degeneration; Other ill-defined conditions; Portal hypertension ([address] Psychiatric disorder; PUD (peptic ulcer disease); Thrombocytopenia ([address] and Varices, esophageal ([facility] Mr. Jerman Osorio  has a past surgical history that includes orthopaedic (1999) and other surgical.  Social History/Living Environment:   Home Environment: Private residence  One/Two Story Residence: One story  Living Alone: No  Support Systems: Spouse/Significant Other/Partner  Patient Expects to be Discharged to[de-identified] Unknown  Current DME Used/Available at Home: Cane, straight, Walker  Tub or Shower Type: Shower  Prior Level of Function/Work/Activity:   Patient lives with his spouse in a single story residence with steps to enter. At baseline, patient has a history significant for Parkinson's Disease with frequent falls, requiring assistance with ADLs from spouse, and ambulates household distances with a SPC. Number of Personal Factors/Comorbidities that affect the Plan of Care: 1-2: MODERATE COMPLEXITY   EXAMINATION:   Most Recent Physical Functioning:   Gross Assessment:                  Posture:     Balance:    Bed Mobility:     Wheelchair Mobility:     Transfers:     Gait:             Body Structures Involved:  1. Bones  2. Joints  3. Muscles  4. Ligaments Body Functions Affected:  1. Neuromusculoskeletal  2. Movement Related Activities and Participation Affected:  1. Mobility  2. Self Care  3.  Domestic Life   Number of elements that affect the Plan of Care: 4+: HIGH COMPLEXITY   CLINICAL PRESENTATION:   Presentation: Evolving clinical presentation with changing clinical characteristics: MODERATE COMPLEXITY   CLINICAL DECISION MAKING:   [employer] AM-PAC 6 Clicks   Basic Mobility Inpatient Short Form  How much difficulty does the patient currently have. .. Unable A Lot A Little None   1. Turning over in bed (including adjusting bedclothes, sheets and blankets)? [ ] 1   [X] 2   [ ] 3   [ ] 4   2. Sitting down on and standing up from a chair with arms ( e.g., wheelchair, bedside commode, etc.)   [ ] 1   [X] 2   [ ] 3   [ ] 4   3. Moving from lying on back to sitting on the side of the bed? [ ] 1   [X] 2   [ ] 3   [ ] 4   How much help from another person does the patient currently need. .. Total A Lot A Little None   4. Moving to and from a bed to a chair (including a wheelchair)? [X] 1   [ ] 2   [ ] 3   [ ] 4   5. Need to walk in hospital room? [X] 1   [ ] 2   [ ] 3   [ ] 4   6. Climbing 3-5 steps with a railing? [X] 1   [ ] 2   [ ] 3   [ ] 4   © 2007, Trustees of [address] [place] [address] [address] [address], under license to [employer]. All rights reserved    Score:  Initial: 9 Most Recent: 9 (Date: 8/16/17 )     Interpretation of Tool:  Represents activities that are increasingly more difficult (i.e. Bed mobility, Transfers, Gait). Score 24 23 22-20 19-15 14-10 9-7 6       Modifier CH CI CJ CK CL CM CN         · Mobility - Walking and Moving Around:               - CURRENT STATUS:    CM - 80%-99% impaired, limited or restricted               - GOAL STATUS:           CL - 60%-79% impaired, limited or restricted               - D/C STATUS:                       ---------------To be determined---------------  [place] / Product Type: Medicare /       Medical Necessity:     · Patient demonstrates fair rehab potential due to higher previous functional level. Reason for Services/Other Comments:  · Patient continues to require skilled intervention due to decreased functional mobility and balance/gait status from baseline. .   Use of outcome tool(s) and clinical judgement create a POC that gives a: Questionable prediction of patient's progress: MODERATE COMPLEXITY                 TREATMENT:      Pre-treatment Symptoms/Complaints:  Joking around during therapy. Pain: Initial:      Post Session:  Endorses comfort in bedside chair. Therapeutic Activity: (    0 Minutes): Therapeutic activities including bed mobility, transfer training, balance training, safety awareness training, and patient education to improve mobility, strength, balance and coordination. Required maximal   to promote static and dynamic balance in standing and promote coordination of bilateral, lower extremity(s). Therapeutic Exercise: (  15 minutes):  Exercises per grid below to improve mobility, strength and coordination. Required minimal visual, verbal and manual cues to promote proper body alignment and promote proper body posture. Progressed range and repetitions as indicated. Date:  8/17/17 Date:   Date:     ACTIVITY/EXERCISE AM PM AM PM AM PM   Supine ankle pumps X 10 B, AA R        Supine heel slides X 10 B, AA R        Supine hip abd X 10 B, AA R        Supine SAQ X 10 B, AA R                                   B = bilateral; AA = active assistive; A = active; P = passive          Braces/Orthotics/Lines/Etc:   · IV  · O2 Device: Nasal cannula  Treatment/Session Assessment:    · Response to Treatment:  See above. · Interdisciplinary Collaboration:  · Physical Therapy Assistant and Registered Nurse  · After treatment position/precautions:  · Supine in bed, Bed alarm/tab alert on, Bed/Chair-wheels locked, Bed in low position, Call light within reach, RN notified and Family at bedside  · Compliance with Program/Exercises: Will assess as treatment progresses. · Recommendations/Intent for next treatment session: \"Next visit will focus on advancements to more challenging activities and reduction in assistance provided\".      Total Treatment Duration:PT Patient Time In/Time Out  Time In: 1020  Time Out: 74-03 [facility], PTA

## 2017-08-17 NOTE — PROGRESS NOTES
Problem: Falls - Risk of  Goal: *Absence of Falls  Document Matheus Fall Risk and appropriate interventions in the flowsheet.    Outcome: Progressing Towards Goal  Fall Risk Interventions:  Mobility Interventions: Bed/chair exit alarm     Mentation Interventions: Bed/chair exit alarm     Medication Interventions: Bed/chair exit alarm     Elimination Interventions: Bed/chair exit alarm     History of Falls Interventions: Bed/chair exit alarm

## 2017-08-17 NOTE — PROGRESS NOTES
Problem: Mobility Impaired (Adult and Pediatric)  Goal: *Acute Goals and Plan of Care (Insert Text)  STG:  (1.)Mr. Red Green will perform bed mobility with MODERATE ASSIST within 3 day(s). (2.)Mr. Red Green will transfer from bed to chair and chair to bed with MODERATE ASSIST using the least restrictive device within 3 day(s). (3.)Mr. Red Green will ambulate with MODERATE ASSIST for 10+ feet with the least restrictive device within 3 day(s). (4.)Mr. Red Green will tolerate 15+ minutes of therapeutic activity/exercise while maintaining stable vitals to improve functional strength and endurance within 3 day(s). LTG:  (1.)Mr. Red Green will perform bed mobility with MINIMAL ASSIST within 7 day(s). (2.)Mr. Red Green will transfer from bed to chair and chair to bed with MINIMAL ASSIST using the least restrictive device within 7 day(s). (3.)Mr. Red Green will ambulate with MINIMAL ASSIST for 25+ feet with the least restrictive device within 7 day(s). (4.)Mr. Red Green will tolerate 25+ minutes of therapeutic activity/exercise while maintaining stable vitals to improve functional strength and endurance within 7 day(s).   ________________________________________________________________________________________________      PHYSICAL THERAPY: Daily Note, Treatment Day: 1st and PM    8/17/2017  INPATIENT: Hospital Day: 4  [place] / [place] / Product Type: Medicare /       [place]     NAME/AGE/GENDER: Dossie Going is a 80 y.o. male        PRIMARY DIAGNOSIS: Intertrochanteric fracture of right femur, closed, initial encounter ([facility] [address] Marina Hernandez <principal problem not specified> <principal problem not specified>  Procedure(s) (LRB):  RIGHT FEMUR INSERTION INTRA MEDULLARY NAIL (Right)  2 Days Post-Op  ICD-10: Treatment Diagnosis:       · Generalized Muscle Weakness (M62.81)  · Difficulty in walking, Not elsewhere classified (R26.2)  · History of falling (Z91.81)   Precaution/Allergies:  Review of patient's allergies indicates no known allergies. ASSESSMENT:      Mr. Jessica Hope is a 80year old male 1 day s/p right femoral IM nailing and is WBAT R LE. Patient lives with his spouse in a single story residence with steps to enter. At baseline, patient has a history significant for Parkinson's Disease as well as being legally blind and hard of hearing per chart. Mr. Jessica Hope endorses frequent falls at home, requires assistance with ADLs from spouse, and ambulates household distances with a SPC. This afternoon, pt supine and wants to get up. Pt performed supine to sit with max assist.  Pt initially leaning posteriorly, however with time and cues improved. Pt stood with max assist x 2 and transferred to [facility]. Pt voided and then stood and transferred over to the chair using rolling walker and mod/max assist x 2. Pt does have a posterior lean with sit to stand and initial standing. Pt was able to shuffle his feet over with cues. Pt was left up in chair with needxs in reach. Good progress this afternoon with transfers and overall mobility. Will continue with POC. This section established at most recent assessment   PROBLEM LIST (Impairments causing functional limitations):  1. Decreased Strength  2. Decreased ADL/Functional Activities  3. Decreased Transfer Abilities  4. Decreased Ambulation Ability/Technique  5. Decreased Balance  6. Increased Pain  7. Decreased Activity Tolerance  8. Increased Shortness of Breath  9. Decreased Flexibility/Joint Mobility  10. Decreased Knowledge of Precautions  11. Decreased [place] with Home Exercise Program  12. Decreased Cognition    INTERVENTIONS PLANNED: (Benefits and precautions of physical therapy have been discussed with the patient.)  1. Balance Exercise  2. Bed Mobility  3. Family Education  4. Gait Training  5. Home Exercise Program (HEP)  6. Range of Motion (ROM)  7. Therapeutic Activites  8. Ultrasound (US)  9.  Group Therapy      TREATMENT PLAN: Frequency/Duration: twice daily for duration of hospital stay  Rehabilitation Potential For Stated Goals: Domonique Mancuso REHABILITATION/EQUIPMENT: (at time of discharge pending progress): Continue Skilled Therapy. HISTORY:   History of Present Injury/Illness (Reason for Referral):  S/p right femoral IM nailing  Past Medical History/Comorbidities:   Mr. Eve Cohen  has a past medical history of Alcohol abuse; AVM (arteriovenous malformation) of colon; Diabetes ([address] Hypertension; Liver disease; Macular degeneration; Other ill-defined conditions; Portal hypertension ([address] Psychiatric disorder; PUD (peptic ulcer disease); Thrombocytopenia ([address] and Varices, esophageal ([address] Mr. Eve Cohen  has a past surgical history that includes orthopaedic (1999) and other surgical.  Social History/Living Environment:   Home Environment: Private residence  One/Two Story Residence: One story  Living Alone: No  Support Systems: Spouse/Significant Other/Partner  Patient Expects to be Discharged to[de-identified] Unknown  Current DME Used/Available at Home: Cane, straight, Walker  Tub or Shower Type: Shower  Prior Level of Function/Work/Activity:   Patient lives with his spouse in a single story residence with steps to enter. At baseline, patient has a history significant for Parkinson's Disease with frequent falls, requiring assistance with ADLs from spouse, and ambulates household distances with a SPC.    Number of Personal Factors/Comorbidities that affect the Plan of Care: 1-2: MODERATE COMPLEXITY   EXAMINATION:   Most Recent Physical Functioning:   Gross Assessment:                  Posture:     Balance:  Sitting - Static: Fair (occasional)  Sitting - Dynamic: Fair (occasional)  Standing - Static: Poor  Standing - Dynamic : Poor Bed Mobility:  Supine to Sit: Maximum assistance  Scooting: Maximum assistance  Wheelchair Mobility:     Transfers:  Sit to Stand: Maximum assistance;Assist x2  Stand to Sit: Moderate assistance;Assist x2  Bed to Chair: Maximum assistance; Moderate assistance;Assist x2  Interventions: Manual cues; Safety awareness training; Tactile cues; Verbal cues  Gait:             Body Structures Involved:  1. Bones  2. Joints  3. Muscles  4. Ligaments Body Functions Affected:  1. Neuromusculoskeletal  2. Movement Related Activities and Participation Affected:  1. Mobility  2. Self Care  3. Domestic Life   Number of elements that affect the Plan of Care: 4+: HIGH COMPLEXITY   CLINICAL PRESENTATION:   Presentation: Evolving clinical presentation with changing clinical characteristics: MODERATE COMPLEXITY   CLINICAL DECISION MAKIN [facility] Inpatient Short Form  How much difficulty does the patient currently have. .. Unable A Lot A Little None   1. Turning over in bed (including adjusting bedclothes, sheets and blankets)? [ ] 1   [X] 2   [ ] 3   [ ] 4   2. Sitting down on and standing up from a chair with arms ( e.g., wheelchair, bedside commode, etc.)   [ ] 1   [X] 2   [ ] 3   [ ] 4   3. Moving from lying on back to sitting on the side of the bed? [ ] 1   [X] 2   [ ] 3   [ ] 4   How much help from another person does the patient currently need. .. Total A Lot A Little None   4. Moving to and from a bed to a chair (including a wheelchair)? [X] 1   [ ] 2   [ ] 3   [ ] 4   5. Need to walk in hospital room? [X] 1   [ ] 2   [ ] 3   [ ] 4   6. Climbing 3-5 steps with a railing? [X] 1   [ ] 2   [ ] 3   [ ] 4   © , Trustees of [address] [place] [address], under license to [employer]. All rights reserved    Score:  Initial: 9 Most Recent: 9 (Date: 17 )     Interpretation of Tool:  Represents activities that are increasingly more difficult (i.e. Bed mobility, Transfers, Gait).        Score 24 23 22-20 19-15 14-10 9-7 6       Modifier CH CI CJ CK CL CM CN         · Mobility - Walking and Moving Around:               - CURRENT STATUS:    CM - 80%-99% impaired, limited or restricted               - GOAL STATUS:           CL - 60%-79% impaired, limited or restricted               - D/C STATUS:                       ---------------To be determined---------------  [place] / [place] / Product Type: Medicare /       Medical Necessity:     · Patient demonstrates fair rehab potential due to higher previous functional level. Reason for Services/Other Comments:  · Patient continues to require skilled intervention due to decreased functional mobility and balance/gait status from baseline. .   Use of outcome tool(s) and clinical judgement create a POC that gives a: Questionable prediction of patient's progress: MODERATE COMPLEXITY                 TREATMENT:      Pre-treatment Symptoms/Complaints:  Joking around during therapy. Pain: Initial:      Post Session:  Endorses comfort in bedside chair. Therapeutic Activity: (    26 Minutes): Therapeutic activities including bed mobility, transfer training, balance training, safety awareness training, and patient education to improve mobility, strength, balance and coordination. Required maximal   to promote static and dynamic balance in standing and promote coordination of bilateral, lower extremity(s). Therapeutic Exercise: (  0 minutes):  Exercises per grid below to improve mobility, strength and coordination. Required minimal visual, verbal and manual cues to promote proper body alignment and promote proper body posture. Progressed range and repetitions as indicated.      Date:  8/17/17 Date:   Date:     ACTIVITY/EXERCISE AM PM AM PM AM PM   Supine ankle pumps X 10 B, AA R        Supine heel slides X 10 B, AA R        Supine hip abd X 10 B, AA R        Supine SAQ X 10 B, AA R                                   B = bilateral; AA = active assistive; A = active; P = passive          Braces/Orthotics/Lines/Etc:   · IV  · O2 Device: Nasal cannula  Treatment/Session Assessment:    · Response to Treatment:  See above. · Interdisciplinary Collaboration:  · Physical Therapy Assistant and Registered Nurse  · After treatment position/precautions:  · Up in chair, Bed alarm/tab alert on, Bed/Chair-wheels locked, Call light within reach and RN notified  · Compliance with Program/Exercises: Will assess as treatment progresses. · Recommendations/Intent for next treatment session: \"Next visit will focus on advancements to more challenging activities and reduction in assistance provided\".      Total Treatment Duration:PT Patient Time In/Time Out  Time In: 5497  Time Out: [facility]

## 2017-08-17 NOTE — PROGRESS NOTES
OT note:  Pt still working with PTA upon attempt. Will re-attempt at a later date/time.   Jason Zaidi

## 2017-08-17 NOTE — PROGRESS NOTES
Hospitalist Progress Note    2017  Admit Date: 2017  3:16 PM   NAME: Paola Robledo   :  7/15/1933   MRN:  [id number]   Attending: Princess Navas DO  PCP:  Bruce Godwin MD    SUBJECTIVE:   Patient is an 80years old male with pmhx of parkinsons disease and recurrent falls, currently being admitted for right nondisplaced intertrochanteric fracture. Pt also has chronic thrombocytopenia without any signs of mucosal or submucosal bleeding. Ortho has been consulted, and plan for surgery today afternoon 8/15.    :  Seen at bedside. Complains right heel pain and right shoulder pain. Denies nausea or vomiting, chest pain, SOB, cough. Review of Systems negative with exception of pertinent positives noted above  PHYSICAL EXAM     Visit Vitals    /54    Pulse 83    Temp 98.6 °F (37 °C)    Resp 16    SpO2 100%      Temp (24hrs), Av °F (36.7 °C), Min:97.4 °F (36.3 °C), Max:98.6 °F (37 °C)    Oxygen Therapy  O2 Sat (%): 100 % (17 0404)  Pulse via Oximetry: 88 beats per minute (08/15/17 1722)  O2 Device: Nasal cannula (17 1134)  O2 Flow Rate (L/min): 2 l/min (17 1134)    Intake/Output Summary (Last 24 hours) at 17 0808  Last data filed at 17 0200   Gross per 24 hour   Intake                0 ml   Output              200 ml   Net             -200 ml      General: No acute distress, elderly, frail    Lungs:  CTA Bilaterally.    Heart:  Regular rate and rhythm,  No murmur, rub, or gallop  Abdomen: Soft, Non distended, Non tender, Positive bowel sounds  Extremities: No cyanosis, clubbing or edema, surgical dressing on right hip, bruise over right shoulder  Neurologic:  gcs 15, no motor or sensory deficits, CN 2-12 intact  Psych:             AO x 2, mood and affect anxious    ASSESSMENT      Active Hospital Problems    Diagnosis Date Noted    Fracture closed, femur, shaft ([address] 2017    Fracture 2017     Plan:  · S/p ORIF for right intertrochanteric hip fracture: POD # 2  · Post op care and pain management as per ortho  · Acute post op  Blood loss anemia: Hb 7.4, will transfuse 1 unit PRBC, check post transfusion H/H.  · PT/OT eval   · PPD  · Continue other home meds as reconciled in MAR. · Risk high with opioids on board. Pt has a bed at [facility], will likely be discharged tomorrow.     DVT Prophylaxis: SCD and TEDs    Signed By: Dee Dee Coulter MD     August 17, 2017

## 2017-08-18 ENCOUNTER — APPOINTMENT (OUTPATIENT)
Dept: GENERAL RADIOLOGY | Age: 82
DRG: 481 | End: 2017-08-18
Attending: HOSPITALIST
Payer: MEDICARE

## 2017-08-18 LAB
ABO + RH BLD: NORMAL
ANION GAP SERPL CALC-SCNC: 6 MMOL/L (ref 7–16)
BASOPHILS # BLD: 0 K/UL (ref 0–0.2)
BASOPHILS NFR BLD: 0 % (ref 0–2)
BLD PROD TYP BPU: NORMAL
BLOOD BANK CMNT PATIENT-IMP: NORMAL
BLOOD GROUP ANTIBODIES SERPL: NORMAL
BPU ID: NORMAL
BUN SERPL-MCNC: 26 MG/DL (ref 8–23)
CALCIUM SERPL-MCNC: 9.2 MG/DL (ref 8.3–10.4)
CHLORIDE SERPL-SCNC: 107 MMOL/L (ref 98–107)
CO2 SERPL-SCNC: 28 MMOL/L (ref 21–32)
CREAT SERPL-MCNC: 0.98 MG/DL (ref 0.8–1.5)
CROSSMATCH RESULT,%XM: NORMAL
DIFFERENTIAL METHOD BLD: ABNORMAL
EOSINOPHIL # BLD: 0.2 K/UL (ref 0–0.8)
EOSINOPHIL NFR BLD: 7 % (ref 0.5–7.8)
ERYTHROCYTE [DISTWIDTH] IN BLOOD BY AUTOMATED COUNT: 16 % (ref 11.9–14.6)
GLUCOSE BLD STRIP.AUTO-MCNC: 127 MG/DL (ref 65–100)
GLUCOSE BLD STRIP.AUTO-MCNC: 171 MG/DL (ref 65–100)
GLUCOSE BLD STRIP.AUTO-MCNC: 216 MG/DL (ref 65–100)
GLUCOSE BLD STRIP.AUTO-MCNC: 237 MG/DL (ref 65–100)
GLUCOSE SERPL-MCNC: 154 MG/DL (ref 65–100)
HCT VFR BLD AUTO: 22.2 % (ref 41.1–50.3)
HGB BLD-MCNC: 7.7 G/DL (ref 13.6–17.2)
IMM GRANULOCYTES # BLD: 0 K/UL (ref 0–0.5)
IMM GRANULOCYTES NFR BLD: 0 % (ref 0–5)
LYMPHOCYTES # BLD: 0.4 K/UL (ref 0.5–4.6)
LYMPHOCYTES NFR BLD: 16 % (ref 13–44)
MCH RBC QN AUTO: 29.7 PG (ref 26.1–32.9)
MCHC RBC AUTO-ENTMCNC: 34.7 G/DL (ref 31.4–35)
MCV RBC AUTO: 85.7 FL (ref 79.6–97.8)
MONOCYTES # BLD: 0.2 K/UL (ref 0.1–1.3)
MONOCYTES NFR BLD: 10 % (ref 4–12)
NEUTS SEG # BLD: 1.6 K/UL (ref 1.7–8.2)
NEUTS SEG NFR BLD: 67 % (ref 43–78)
PLATELET # BLD AUTO: 41 K/UL (ref 150–450)
PMV BLD AUTO: 9.9 FL (ref 10.8–14.1)
POTASSIUM SERPL-SCNC: 4.3 MMOL/L (ref 3.5–5.1)
RBC # BLD AUTO: 2.59 M/UL (ref 4.23–5.67)
SODIUM SERPL-SCNC: 141 MMOL/L (ref 136–145)
SPECIMEN EXP DATE BLD: NORMAL
STATUS OF UNIT,%ST: NORMAL
UNIT DIVISION, %UDIV: 0
WBC # BLD AUTO: 2.3 K/UL (ref 4.3–11.1)

## 2017-08-18 PROCEDURE — 74011250637 HC RX REV CODE- 250/637: Performed by: ORTHOPAEDIC SURGERY

## 2017-08-18 PROCEDURE — 85025 COMPLETE CBC W/AUTO DIFF WBC: CPT | Performed by: NURSE PRACTITIONER

## 2017-08-18 PROCEDURE — 80048 BASIC METABOLIC PNL TOTAL CA: CPT | Performed by: NURSE PRACTITIONER

## 2017-08-18 PROCEDURE — 74011250637 HC RX REV CODE- 250/637: Performed by: HOSPITALIST

## 2017-08-18 PROCEDURE — 74011250637 HC RX REV CODE- 250/637: Performed by: NURSE PRACTITIONER

## 2017-08-18 PROCEDURE — 74011000258 HC RX REV CODE- 258: Performed by: HOSPITALIST

## 2017-08-18 PROCEDURE — 77030013131 HC IV BLD ST ICUM -A

## 2017-08-18 PROCEDURE — 86920 COMPATIBILITY TEST SPIN: CPT | Performed by: NURSE PRACTITIONER

## 2017-08-18 PROCEDURE — 73030 X-RAY EXAM OF SHOULDER: CPT

## 2017-08-18 PROCEDURE — 97532 HC OT COGNITIVE SKILL DEV 15 MIN: CPT

## 2017-08-18 PROCEDURE — 65270000029 HC RM PRIVATE

## 2017-08-18 PROCEDURE — C9113 INJ PANTOPRAZOLE SODIUM, VIA: HCPCS | Performed by: HOSPITALIST

## 2017-08-18 PROCEDURE — 87086 URINE CULTURE/COLONY COUNT: CPT | Performed by: HOSPITALIST

## 2017-08-18 PROCEDURE — 74011250636 HC RX REV CODE- 250/636: Performed by: HOSPITALIST

## 2017-08-18 PROCEDURE — 36415 COLL VENOUS BLD VENIPUNCTURE: CPT | Performed by: NURSE PRACTITIONER

## 2017-08-18 PROCEDURE — 77030011943

## 2017-08-18 PROCEDURE — 74011636637 HC RX REV CODE- 636/637: Performed by: HOSPITALIST

## 2017-08-18 PROCEDURE — 97530 THERAPEUTIC ACTIVITIES: CPT

## 2017-08-18 PROCEDURE — P9016 RBC LEUKOCYTES REDUCED: HCPCS | Performed by: NURSE PRACTITIONER

## 2017-08-18 PROCEDURE — 82962 GLUCOSE BLOOD TEST: CPT

## 2017-08-18 PROCEDURE — 74011250636 HC RX REV CODE- 250/636: Performed by: NURSE PRACTITIONER

## 2017-08-18 PROCEDURE — 86900 BLOOD TYPING SEROLOGIC ABO: CPT | Performed by: NURSE PRACTITIONER

## 2017-08-18 PROCEDURE — 36430 TRANSFUSION BLD/BLD COMPNT: CPT

## 2017-08-18 RX ORDER — SODIUM CHLORIDE 9 MG/ML
250 INJECTION, SOLUTION INTRAVENOUS AS NEEDED
Status: DISCONTINUED | OUTPATIENT
Start: 2017-08-18 | End: 2017-08-20 | Stop reason: HOSPADM

## 2017-08-18 RX ADMIN — INSULIN LISPRO 4 UNITS: 100 INJECTION, SOLUTION INTRAVENOUS; SUBCUTANEOUS at 17:39

## 2017-08-18 RX ADMIN — INSULIN LISPRO 4 UNITS: 100 INJECTION, SOLUTION INTRAVENOUS; SUBCUTANEOUS at 12:19

## 2017-08-18 RX ADMIN — ACETAMINOPHEN 650 MG: 325 TABLET, FILM COATED ORAL at 13:21

## 2017-08-18 RX ADMIN — FERROUS SULFATE TAB 325 MG (65 MG ELEMENTAL FE) 325 MG: 325 (65 FE) TAB at 08:49

## 2017-08-18 RX ADMIN — PANTOPRAZOLE SODIUM 40 MG: 40 INJECTION, POWDER, FOR SOLUTION INTRAVENOUS at 08:50

## 2017-08-18 RX ADMIN — TRAMADOL HYDROCHLORIDE 50 MG: 50 TABLET, FILM COATED ORAL at 06:18

## 2017-08-18 RX ADMIN — TRAMADOL HYDROCHLORIDE 50 MG: 50 TABLET, FILM COATED ORAL at 12:19

## 2017-08-18 RX ADMIN — INSULIN LISPRO 2 UNITS: 100 INJECTION, SOLUTION INTRAVENOUS; SUBCUTANEOUS at 08:51

## 2017-08-18 RX ADMIN — Medication 10 ML: at 06:18

## 2017-08-18 RX ADMIN — CALCIUM CARBONATE 500 MG (1,250 MG)-VITAMIN D3 200 UNIT TABLET 1 TABLET: at 17:25

## 2017-08-18 RX ADMIN — ALLOPURINOL 100 MG: 100 TABLET ORAL at 08:49

## 2017-08-18 RX ADMIN — DOCUSATE SODIUM 100 MG: 100 CAPSULE, LIQUID FILLED ORAL at 17:25

## 2017-08-18 RX ADMIN — ALPRAZOLAM 2 MG: 0.5 TABLET ORAL at 08:49

## 2017-08-18 RX ADMIN — RIFAXIMIN 550 MG: 550 TABLET ORAL at 08:49

## 2017-08-18 RX ADMIN — ALPRAZOLAM 2 MG: 0.5 TABLET ORAL at 17:25

## 2017-08-18 RX ADMIN — LACTULOSE 10 G: 20 SOLUTION ORAL at 08:50

## 2017-08-18 RX ADMIN — ACETAMINOPHEN 650 MG: 325 TABLET, FILM COATED ORAL at 21:33

## 2017-08-18 RX ADMIN — PANTOPRAZOLE SODIUM 40 MG: 40 INJECTION, POWDER, FOR SOLUTION INTRAVENOUS at 21:33

## 2017-08-18 RX ADMIN — CALCIUM CARBONATE 500 MG (1,250 MG)-VITAMIN D3 200 UNIT TABLET 1 TABLET: at 12:19

## 2017-08-18 RX ADMIN — Medication 100 MG: at 08:49

## 2017-08-18 RX ADMIN — ENOXAPARIN SODIUM 30 MG: 30 INJECTION SUBCUTANEOUS at 15:41

## 2017-08-18 RX ADMIN — DOCUSATE SODIUM 100 MG: 100 CAPSULE, LIQUID FILLED ORAL at 08:49

## 2017-08-18 RX ADMIN — ACETAMINOPHEN 650 MG: 325 TABLET, FILM COATED ORAL at 06:17

## 2017-08-18 RX ADMIN — RIFAXIMIN 550 MG: 550 TABLET ORAL at 17:25

## 2017-08-18 RX ADMIN — CALCIUM CARBONATE 500 MG (1,250 MG)-VITAMIN D3 200 UNIT TABLET 1 TABLET: at 08:49

## 2017-08-18 RX ADMIN — Medication 10 ML: at 21:32

## 2017-08-18 RX ADMIN — CALCIUM CARBONATE-CHOLECALCIFEROL TAB 250 MG-125 UNIT 1 TABLET: 250-125 TAB at 08:49

## 2017-08-18 RX ADMIN — CYANOCOBALAMIN TAB 1000 MCG 1000 MCG: 1000 TAB at 08:49

## 2017-08-18 RX ADMIN — CEFTRIAXONE 1 G: 1 INJECTION, POWDER, FOR SOLUTION INTRAMUSCULAR; INTRAVENOUS at 16:00

## 2017-08-18 RX ADMIN — FERROUS SULFATE TAB 325 MG (65 MG ELEMENTAL FE) 325 MG: 325 (65 FE) TAB at 17:25

## 2017-08-18 NOTE — PROGRESS NOTES
Problem: Mobility Impaired (Adult and Pediatric)  Goal: *Acute Goals and Plan of Care (Insert Text)  STG:  (1.)Mr. Erika Dean will perform bed mobility with MODERATE ASSIST within 3 day(s). (2.)Mr. Erika Dean will transfer from bed to chair and chair to bed with MODERATE ASSIST using the least restrictive device within 3 day(s). (3.)Mr. Erika Dean will ambulate with MODERATE ASSIST for 10+ feet with the least restrictive device within 3 day(s). (4.)Mr. Erika Dean will tolerate 15+ minutes of therapeutic activity/exercise while maintaining stable vitals to improve functional strength and endurance within 3 day(s). LTG:  (1.)Mr. Erika Dean will perform bed mobility with MINIMAL ASSIST within 7 day(s). (2.)Mr. Erika Dean will transfer from bed to chair and chair to bed with MINIMAL ASSIST using the least restrictive device within 7 day(s). (3.)Mr. Erika Dean will ambulate with MINIMAL ASSIST for 25+ feet with the least restrictive device within 7 day(s). (4.)Mr. Erika Dean will tolerate 25+ minutes of therapeutic activity/exercise while maintaining stable vitals to improve functional strength and endurance within 7 day(s).   ________________________________________________________________________________________________      PHYSICAL THERAPY: Daily Note, Treatment Day: 2nd and AM    8/18/2017  INPATIENT: Hospital Day: 5  [place] / Product Type: Medicare /       Maddi POLK     NAME/AGE/GENDER: Lashay Avila is a 80 y.o. male        PRIMARY DIAGNOSIS: Intertrochanteric fracture of right femur, closed, initial encounter [facility] Micheal Hopper <principal problem not specified> <principal problem not specified>  Procedure(s) (LRB):  RIGHT FEMUR INSERTION INTRA MEDULLARY NAIL (Right)  3 Days Post-Op  ICD-10: Treatment Diagnosis:       · Generalized Muscle Weakness (M62.81)  · Difficulty in walking, Not elsewhere classified (R26.2)  · History of falling (Z91.81)   Precaution/Allergies:  Review of patient's allergies indicates no known allergies. ASSESSMENT:      Mr. Saumya Walker is a 80year old male s/p right femoral IM nailing and is WBAT R LE. Patient lives with his spouse in a single story residence with steps to enter. At baseline, patient has a history significant for Parkinson's Disease as well as being legally blind and hard of hearing per chart. Mr. Saumya Walker endorses frequent falls at home, requires assistance with ADLs from spouse, and ambulates household distances with a SPC. Pt was supine and agreeable to get OOB with PT. Pt performed supine to sit with max assist x 2. He sat several minutes working on balance. He stood with max assist x 2 and was instructed in walker manipulation and gt. He stated he needed to sit down. He sat. Rested. Then stood again. He slowly began moving his feet toward chair very slowly and with max assist x 2 to maintain standing. Half way through transfer, transport came to take him for testing so he began shuffling his feet back around to sit at EOB. He was returned supine. All movements extremely slow. Pt does have a posterior lean with sit to stand and initial standing. No progress noted today although pt gave good effort. Will continue with effort. This section established at most recent assessment   PROBLEM LIST (Impairments causing functional limitations):  1. Decreased Strength  2. Decreased ADL/Functional Activities  3. Decreased Transfer Abilities  4. Decreased Ambulation Ability/Technique  5. Decreased Balance  6. Increased Pain  7. Decreased Activity Tolerance  8. Increased Shortness of Breath  9. Decreased Flexibility/Joint Mobility  10. Decreased Knowledge of Precautions  11. Decreased [place] with Home Exercise Program  12. Decreased Cognition    INTERVENTIONS PLANNED: (Benefits and precautions of physical therapy have been discussed with the patient.)  1. Balance Exercise  2. Bed Mobility  3. Family Education  4. Gait Training  5.  Home Exercise Program (HEP)  6. Range of Motion (ROM)  7. Therapeutic Activites  8. Ultrasound (US)  9. Group Therapy      TREATMENT PLAN: Frequency/Duration: twice daily for duration of hospital stay  Rehabilitation Potential For Stated Goals: Eulalio Vazquez REHABILITATION/EQUIPMENT: (at time of discharge pending progress): Continue Skilled Therapy. HISTORY:   History of Present Injury/Illness (Reason for Referral):  S/p right femoral IM nailing  Past Medical History/Comorbidities:   Mr. Arben Coronel  has a past medical history of Alcohol abuse; AVM (arteriovenous malformation) of colon; Diabetes ([address] Hypertension; Liver disease; Macular degeneration; Other ill-defined conditions; Portal hypertension ([address] Psychiatric disorder; PUD (peptic ulcer disease); Thrombocytopenia ([address] and Varices, esophageal ([address] Mr. Arben Coronel  has a past surgical history that includes orthopaedic (1999) and other surgical.  Social History/Living Environment:   Home Environment: Private residence  One/Two Story Residence: One story  Living Alone: No  Support Systems: Spouse/Significant Other/Partner  Patient Expects to be Discharged to[de-identified] Unknown  Current DME Used/Available at Home: Cane, straight, Walker  Tub or Shower Type: Shower  Prior Level of Function/Work/Activity:   Patient lives with his spouse in a single story residence with steps to enter. At baseline, patient has a history significant for Parkinson's Disease with frequent falls, requiring assistance with ADLs from spouse, and ambulates household distances with a SPC. Number of Personal Factors/Comorbidities that affect the Plan of Care: 1-2: MODERATE COMPLEXITY   EXAMINATION:   Most Recent Physical Functioning:   Gross Assessment:                  Posture:  Posture (WDL): Exceptions to WDL  Posture Assessment:  Forward head  Balance:  Sitting: Impaired  Sitting - Static: Fair (occasional)  Sitting - Dynamic: Fair (occasional)  Standing: Impaired  Standing - Static: Poor  Standing - Dynamic : Poor Bed Mobility:  Supine to Sit: Maximum assistance;Assist x2  Sit to Supine: Maximum assistance;Assist x2  Scooting: Maximum assistance  Wheelchair Mobility:     Transfers:  Sit to Stand: Maximum assistance; Additional time;Assist x2  Stand to Sit: Moderate assistance; Additional time;Assist x2  Gait:             Body Structures Involved:  1. Bones  2. Joints  3. Muscles  4. Ligaments Body Functions Affected:  1. Neuromusculoskeletal  2. Movement Related Activities and Participation Affected:  1. Mobility  2. Self Care  3. Domestic Life   Number of elements that affect the Plan of Care: 4+: HIGH COMPLEXITY   CLINICAL PRESENTATION:   Presentation: Evolving clinical presentation with changing clinical characteristics: MODERATE COMPLEXITY   CLINICAL DECISION MAKIN [facility] Inpatient Short Form  How much difficulty does the patient currently have. .. Unable A Lot A Little None   1. Turning over in bed (including adjusting bedclothes, sheets and blankets)? [ ] 1   [X] 2   [ ] 3   [ ] 4   2. Sitting down on and standing up from a chair with arms ( e.g., wheelchair, bedside commode, etc.)   [ ] 1   [X] 2   [ ] 3   [ ] 4   3. Moving from lying on back to sitting on the side of the bed? [ ] 1   [X] 2   [ ] 3   [ ] 4   How much help from another person does the patient currently need. .. Total A Lot A Little None   4. Moving to and from a bed to a chair (including a wheelchair)? [X] 1   [ ] 2   [ ] 3   [ ] 4   5. Need to walk in hospital room? [X] 1   [ ] 2   [ ] 3   [ ] 4   6. Climbing 3-5 steps with a railing? [X] 1   [ ] 2   [ ] 3   [ ] 4   © 2007, Trustees of [address] [place] [address] [address] [address], under license to [employer]. All rights reserved    Score:  Initial: 9 Most Recent: 9 (Date: 17 )     Interpretation of Tool:  Represents activities that are increasingly more difficult (i.e. Bed mobility, Transfers, Gait).        Score 24 23 22-20 19-15 14-10 9-7 6       Modifier CH CI CJ CK CL CM CN         · Mobility - Walking and Moving Around:               - CURRENT STATUS:    CM - 80%-99% impaired, limited or restricted               - GOAL STATUS:           CL - 60%-79% impaired, limited or restricted               - D/C STATUS:                       ---------------To be determined---------------  [place] / Product Type: Medicare /       Medical Necessity:     · Patient demonstrates fair rehab potential due to higher previous functional level. Reason for Services/Other Comments:  · Patient continues to require skilled intervention due to decreased functional mobility and balance/gait status from baseline. .   Use of outcome tool(s) and clinical judgement create a POC that gives a: Questionable prediction of patient's progress: MODERATE COMPLEXITY                 TREATMENT:      Pre-treatment Symptoms/Complaints:  Talkative \"Go slow. That's going to hurt. \"  Pain: Initial:      Post Session:       Therapeutic Activity: (    26 Minutes): Therapeutic activities including bed mobility, transfer training, balance training, safety awareness training, and patient education to improve mobility, strength, balance and coordination. Required maximal   to promote static and dynamic balance in standing and promote coordination of bilateral, lower extremity(s). Therapeutic Exercise: (  0 minutes):  Exercises per grid below to improve mobility, strength and coordination. Required minimal visual, verbal and manual cues to promote proper body alignment and promote proper body posture. Progressed range and repetitions as indicated.      Date:  8/17/17 Date:   Date:     ACTIVITY/EXERCISE AM PM AM PM AM PM   Supine ankle pumps X 10 B, AA R        Supine heel slides X 10 B, AA R        Supine hip abd X 10 B, AA R        Supine SAQ X 10 B, AA R                                   B = bilateral; AA = active assistive; A = active; P = passive          Braces/Orthotics/Lines/Etc:   · IV  · O2 Device: Nasal cannula  Treatment/Session Assessment:    · Response to Treatment:  See above. · Interdisciplinary Collaboration:  · Physical Therapy Assistant and Registered Nurse  · After treatment position/precautions:  · Supine in bed, Bed alarm/tab alert on, Bed/Chair-wheels locked, Call light within reach, RN notified and Family at bedside  · Compliance with Program/Exercises: Will assess as treatment progresses. · Recommendations/Intent for next treatment session: \"Next visit will focus on advancements to more challenging activities and reduction in assistance provided\".      Total Treatment Duration:PT Patient Time In/Time Out  Time In: 0614  Time Out: [facility]

## 2017-08-18 NOTE — PROGRESS NOTES
Problem: Self Care Deficits Care Plan (Adult)  Goal: *Acute Goals and Plan of Care (Insert Text)  1. Patient will maintain WBAT status in RLE for 100% of treatment session and minimal verbal cues from therapist.   2. Patient will complete functional transfers with minimal assistance while maintaining WBAT status in RLE and with adaptive equipment as needed. 3. Patient will complete lower body bathing and dressing with minimal assistance and adaptive equipment as needed. 4. Patient will complete toileting and toilet transfer with minimal assistance. 5. Patient will tolerate 23 minutes of OT treatment with less than 3 rest breaks to increase activity tolerance for ADLs. 6. Patient will participate in at least 23 minutes of BUE therapeutic exercises to strengthen BUE for functional transfers. Timeframe: 7 visits     Comments:       OCCUPATIONAL THERAPY: Daily Note, Treatment Day: 1st and PM    8/18/2017  INPATIENT: Hospital Day: 5  [place]: [place] / Product Type: Medicare /      NAME/AGE/GENDER: Kristin Zelaya is a 80 y.o. male        PRIMARY DIAGNOSIS:  Intertrochanteric fracture of right femur, closed, initial encounter ([facility] Hallie Simpson <principal problem not specified> <principal problem not specified>  Procedure(s) (LRB):  RIGHT FEMUR INSERTION INTRA MEDULLARY NAIL (Right)  3 Days Post-Op  ICD-10: Treatment Diagnosis:        · Pain in Right Hip (M25.551)  · Stiffness of Right Hip, Not elsewhere classified (M25.651)  · Generalized Muscle Weakness (M62.81)  · Repeated Falls (R29.6)  · History of falling (Z91.81)   Precautions/Allergies:        R WBAT Review of patient's allergies indicates no known allergies. ASSESSMENT:      Mr. Dangelo Kebede presents to hospital for s/p above procedure. He lives with his wife at baseline in a [facility] with steps to enter. He has a PMHx which includes Parkinson's, legal blindness, and hearing aid use.  Pt reports 1 fall per month over the last 6 month and he reports that he uses a cane for functional mobility, although he has access to a standard walker and a manual wheelchair. 8/18/2017 Pt in supine upon arrival. Pt confused and required max encouragement to participate and to be re-directed during bed mobility and transfers. Pt required maximal assistance x's 2 with all mobility and transfers. Pt left up in the chair with family at his side. No progress made. Will continue therapeutic efforts. This section established at most recent assessment   PROBLEM LIST (Impairments causing functional limitations):  1. Decreased Strength  2. Decreased ADL/Functional Activities  3. Decreased Transfer Abilities  4. Decreased Ambulation Ability/Technique  5. Decreased Balance  6. Increased Pain  7. Decreased Activity Tolerance  8. Decreased Work Simplification/Energy Conservation Techniques  9. Increased Fatigue  10. Decreased Flexibility/Joint Mobility  11. Edema/Girth  12. Decreased Knowledge of Precautions  13. Decreased [place] with Home Exercise Program  14. Decreased Cognition    INTERVENTIONS PLANNED: (Benefits and precautions of occupational therapy have been discussed with the patient.)  1. Activities of daily living training  2. Adaptive equipment training  3. Balance training  4. Clothing management  5. Cognitive training  6. Donning&doffing training  7. Group therapy  8. Therapeutic activity  9. Therapeutic exercise      TREATMENT PLAN: Frequency/Duration: Follow patient 6x/week to address above goals. Rehabilitation Potential For Stated Goals: FAIR      RECOMMENDED REHABILITATION/EQUIPMENT: (at time of discharge pending progress): Continue Skilled Therapy.                       OCCUPATIONAL PROFILE AND HISTORY:   History of Present Injury/Illness (Reason for Referral):  See H&P  Past Medical History/Comorbidities:   Mr. Lauren Muñoz  has a past medical history of Alcohol abuse; AVM (arteriovenous malformation) of colon; Diabetes ([facility] Hypertension; Liver disease; Macular degeneration; Other ill-defined conditions; Portal hypertension ([facility] Psychiatric disorder; PUD (peptic ulcer disease); Thrombocytopenia ([facility] and Varices, esophageal ([facility] Mr. Ileana Lay  has a past surgical history that includes orthopaedic (1999) and other surgical.  Social History/Living Environment:   Home Environment: Private residence  One/Two Story Residence: One story  Living Alone: No  Support Systems: Spouse/Significant Other/Partner  Patient Expects to be Discharged to[de-identified] Unknown  Current DME Used/Available at Home: Cane, straight, Walker  Tub or Shower Type: Shower  Prior Level of Function/Work/Activity:  Wife not present to assist with baseline data; it is assumed that he requires assistance at baseline with ADLs  Personal Factors:          Sex:  male        Age:  80 y.o. Number of Personal Factors/Comorbidities that affect the Plan of Care: Expanded review of therapy/medical records (1-2):  MODERATE COMPLEXITY   ASSESSMENT OF OCCUPATIONAL PERFORMANCE[de-identified]   Activities of Daily Living:           Basic ADLs (From Assessment) Complex ADLs (From Assessment)   Basic ADL  Feeding: Minimum assistance  Oral Facial Hygiene/Grooming: Moderate assistance  Bathing: Maximum assistance  Upper Body Dressing: Minimum assistance  Lower Body Dressing: Total assistance  Toileting: Total assistance Instrumental ADL  Meal Preparation: Total assistance  Homemaking: Total assistance  Medication Management: Total assistance  Financial Management: Total assistance   Grooming/Bathing/Dressing Activities of Daily Living                             Bed/Mat Mobility  Supine to Sit: Maximum assistance;Assist x2  Sit to Supine: Maximum assistance;Assist x2  Sit to Stand: Maximum assistance; Additional time;Assist x2  Scooting: Maximum assistance          Most Recent Physical Functioning:   Gross Assessment:                  Posture:  Posture (WDL): Exceptions to WDL  Posture Assessment:  Forward head  Balance:  Sitting: Impaired  Sitting - Static: Fair (occasional)  Sitting - Dynamic: Fair (occasional)  Standing: Impaired  Standing - Static: Poor  Standing - Dynamic : Poor Bed Mobility:  Supine to Sit: Maximum assistance;Assist x2  Sit to Supine: Maximum assistance;Assist x2  Scooting: Maximum assistance  Wheelchair Mobility:     Transfers:  Sit to Stand: Maximum assistance; Additional time;Assist x2  Stand to Sit: Moderate assistance; Additional time;Assist x2                    Patient Vitals for the past 6 hrs:   BP BP Patient Position SpO2 Pulse   17 1157 106/54 At rest 98 % 72        Mental Status  Neurologic State: Alert  Orientation Level: Disoriented to place, Disoriented to situation, Oriented to person, Oriented to time  Cognition: Decreased attention/concentration, Follows commands  Perception: Appears intact  Perseveration: Perseverates during conversation  Safety/Judgement: Decreased awareness of need for assistance                               Physical Skills Involved:  1. Range of Motion  2. Balance  3. Strength  4. Activity Tolerance  5. Gross Motor Control  6. Vision  7. Pain (acute) Cognitive Skills Affected (resulting in the inability to perform in a timely and safe manner):  1. Perception  2. Executive Function  3. Immediate Memory  4. Short Term Recall  5. Long Term Memory  6. Sustained Attention  7. Divided Attention  8. Comprehension  9. Expression Psychosocial Skills Affected:  1. Habits/Routines   Number of elements that affect the Plan of Care: 5+:  HIGH COMPLEXITY   CLINICAL DECISION MAKIN [facility] [address] [address] AM-PAC 6 Clicks   Daily Activity Inpatient Short Form  How much help from another person does the patient currently need. .. Total A Lot A Little None   1. Putting on and taking off regular lower body clothing? [X] 1   [ ] 2   [ ] 3   [ ] 4   2. Bathing (including washing, rinsing, drying)? [ ] 1   [X] 2   [ ] 3   [ ] 4   3.   Toileting, which includes using toilet, bedpan or urinal?   [X] 1   [ ] 2   [ ] 3   [ ] 4   4. Putting on and taking off regular upper body clothing?   [ ] 1   [ ] 2   [X] 3   [ ] 4   5. Taking care of personal grooming such as brushing teeth? [ ] 1   [ ] 2   [X] 3   [ ] 4   6. Eating meals? [ ] 1   [ ] 2   [X] 3   [ ] 4   © 2007, Trustees of [address] [place] [address] [address] [address], under license to [employer]. All rights reserved    Score:  Initial: 13 Most Recent: X (Date: -- )     Interpretation of Tool:  Represents activities that are increasingly more difficult (i.e. Bed mobility, Transfers, Gait). Score 24 23 22-20 19-15 14-10 9-7 6       Modifier CH CI CJ CK CL CM CN         · Self Care:               - CURRENT STATUS:    CL - 60%-79% impaired, limited or restricted               - GOAL STATUS:           CK - 40%-59% impaired, limited or restricted               - D/C STATUS:                       ---------------To be determined---------------  [place] / Product Type: Medicare /       Medical Necessity:     · Patient is expected to demonstrate progress in strength, balance, coordination and functional technique to decrease assistance required with ADLs. Reason for Services/Other Comments:  · Patient continues to require skilled intervention due to patient unable to attend/participate in therapy as expected.    Use of outcome tool(s) and clinical judgement create a POC that gives a: MODERATE COMPLEXITY             TREATMENT:   (In addition to Assessment/Re-Assessment sessions the following treatments were rendered)      Pre-treatment Symptoms/Complaints:    Pain: Initial:   Pain Location 1: Leg, Shoulder  Pain Intervention(s) 1: Repositioned  Post Session:  Same, RN aware      Cognitive Skills Development: (24 minutes): Procedure(s) (per grid) utilized to improve and/or restore cognitive functioning as related to ability to follow simple commands, attention to tasks, memory, safety awareness, compensatory strategies and self awareness. Required maximal visual, verbal, manual and   cueing to facilitate improve recall of information and perform graded activities focusing on attention skills. Braces/Orthotics/Lines/Etc:   · IV  · O2 Device: Nasal cannula  Treatment/Session Assessment:    · Response to Treatment:  No progress. · Interdisciplinary Collaboration:  · Physical Therapist, Certified Occupational Therapy Assistant, Registered Nurse and Rehabilitation Attendant  · After treatment position/precautions:  · Up in chair, Bed/Chair-wheels locked, Call light within reach, RN notified and Family at bedside  · Compliance with Program/Exercises: Will assess as treatment progresses. · Recommendations/Intent for next treatment session: \"Next visit will focus on advancements to more challenging activities and reduction in assistance provided\".   Total Treatment Duration:OT Patient Time In/Time Out  Time In: 8866  Time Out: Cyril Lemos

## 2017-08-18 NOTE — PROGRESS NOTES
SPEECH PATHOLOGY NOTE:    Attempted to see patient this AM. Family friend at bedside. Patient somnolent and not waking to verbal stimuli. ST deferred and will re-attempt at later time. Education provided regarding diet recommendations (PUREE/HONEY), silent aspiration on modified barium swallow study, need for upright seating with po intake, and cued double swallows during meal. Recommend continued speech therapy services upon discharge from this facility.      DIONISIO Jimenez, CCC-SLP, CBIS

## 2017-08-18 NOTE — PROGRESS NOTES
Problem: Mobility Impaired (Adult and Pediatric)  Goal: *Acute Goals and Plan of Care (Insert Text)  STG:  (1.)Mr. Rena Wray will perform bed mobility with MODERATE ASSIST within 3 day(s). (2.)Mr. Rena Wray will transfer from bed to chair and chair to bed with MODERATE ASSIST using the least restrictive device within 3 day(s). (3.)Mr. Rena Wray will ambulate with MODERATE ASSIST for 10+ feet with the least restrictive device within 3 day(s). (4.)Mr. Rena Wray will tolerate 15+ minutes of therapeutic activity/exercise while maintaining stable vitals to improve functional strength and endurance within 3 day(s). LTG:  (1.)Mr. Rena Wray will perform bed mobility with MINIMAL ASSIST within 7 day(s). (2.)Mr. Rena Wray will transfer from bed to chair and chair to bed with MINIMAL ASSIST using the least restrictive device within 7 day(s). (3.)Mr. Rena Wray will ambulate with MINIMAL ASSIST for 25+ feet with the least restrictive device within 7 day(s). (4.)Mr. Rena Wray will tolerate 25+ minutes of therapeutic activity/exercise while maintaining stable vitals to improve functional strength and endurance within 7 day(s).   ________________________________________________________________________________________________      PHYSICAL THERAPY: Daily Note, Treatment Day: 2nd and PM    8/18/2017  INPATIENT: Hospital Day: 5  [place] / [place] / Product Type: Medicare /       Melinda POLK     NAME/AGE/GENDER: Georges Moeller is a 80 y.o. male        PRIMARY DIAGNOSIS: Intertrochanteric fracture of right femur, closed, initial encounter ([facility] [address] Anna Rahman <principal problem not specified> <principal problem not specified>  Procedure(s) (LRB):  RIGHT FEMUR INSERTION INTRA MEDULLARY NAIL (Right)  3 Days Post-Op  ICD-10: Treatment Diagnosis:       · Generalized Muscle Weakness (M62.81)  · Difficulty in walking, Not elsewhere classified (R26.2)  · History of falling (Z91.81)   Precaution/Allergies:  Review of patient's allergies indicates no known allergies. ASSESSMENT:      Mr. Sony Galicia is a 80year old male s/p right femoral IM nailing and is WBAT R LE. Patient lives with his spouse in a single story residence with steps to enter. At baseline, patient has a history significant for Parkinson's Disease as well as being legally blind and hard of hearing per chart. Mr. Sony Galicia endorses frequent falls at home, requires assistance with ADLs from spouse, and ambulates household distances with a SPC. Pt was supine and agreeable to get OOB with PT. Pt performed supine to sit with max assist x 2. He sat several minutes working on balance. He stood with max assist x 2 and was instructed in walker manipulation and gt. He attempted to take shuffling steps around to the chair but was unable to stand erect enough to transfer. [place] was moved away from him and he was transferred dependently to the chair. All movements extremely slow. Pt has a posterior lean with sit to stand and initial standing. No progress noted today. He was a little more confused and following commands less this PM.  Will continue with effort. This section established at most recent assessment   PROBLEM LIST (Impairments causing functional limitations):  1. Decreased Strength  2. Decreased ADL/Functional Activities  3. Decreased Transfer Abilities  4. Decreased Ambulation Ability/Technique  5. Decreased Balance  6. Increased Pain  7. Decreased Activity Tolerance  8. Increased Shortness of Breath  9. Decreased Flexibility/Joint Mobility  10. Decreased Knowledge of Precautions  11. Decreased [place] with Home Exercise Program  12. Decreased Cognition    INTERVENTIONS PLANNED: (Benefits and precautions of physical therapy have been discussed with the patient.)  1. Balance Exercise  2. Bed Mobility  3. Family Education  4. Gait Training  5. Home Exercise Program (HEP)  6. Range of Motion (ROM)  7. Therapeutic Activites  8. Ultrasound (US)  9.  Group Therapy      TREATMENT PLAN: Frequency/Duration: twice daily for duration of hospital stay  Rehabilitation Potential For Stated Goals: Gerhardt Jock REHABILITATION/EQUIPMENT: (at time of discharge pending progress): Continue Skilled Therapy. HISTORY:   History of Present Injury/Illness (Reason for Referral):  S/p right femoral IM nailing  Past Medical History/Comorbidities:   Mr. Jonathon Chong  has a past medical history of Alcohol abuse; AVM (arteriovenous malformation) of colon; Diabetes ([address] Hypertension; Liver disease; Macular degeneration; Other ill-defined conditions; Portal hypertension ([address] Psychiatric disorder; PUD (peptic ulcer disease); Thrombocytopenia ([address] and Varices, esophageal ([address] Mr. Jonathon Chong  has a past surgical history that includes orthopaedic (1999) and other surgical.  Social History/Living Environment:   Home Environment: Private residence  One/Two Story Residence: One story  Living Alone: No  Support Systems: Spouse/Significant Other/Partner  Patient Expects to be Discharged to[de-identified] Unknown  Current DME Used/Available at Home: Cane, straight, Walker  Tub or Shower Type: Shower  Prior Level of Function/Work/Activity:   Patient lives with his spouse in a single story residence with steps to enter. At baseline, patient has a history significant for Parkinson's Disease with frequent falls, requiring assistance with ADLs from spouse, and ambulates household distances with a SPC. Number of Personal Factors/Comorbidities that affect the Plan of Care: 1-2: MODERATE COMPLEXITY   EXAMINATION:   Most Recent Physical Functioning:   Gross Assessment:                  Posture:  Posture (WDL): Exceptions to WDL  Posture Assessment:  Forward head  Balance:  Sitting: Impaired  Sitting - Static: Fair (occasional)  Sitting - Dynamic: Fair (occasional)  Standing: Impaired  Standing - Static: Poor  Standing - Dynamic : Poor Bed Mobility:  Supine to Sit: Maximum assistance;Assist x2  Sit to Supine: Maximum assistance;Assist x2  Scooting: Maximum assistance  Wheelchair Mobility:     Transfers:  Sit to Stand: Maximum assistance; Additional time;Assist x2  Stand to Sit: Moderate assistance; Additional time;Assist x2  Gait:             Body Structures Involved:  1. Bones  2. Joints  3. Muscles  4. Ligaments Body Functions Affected:  1. Neuromusculoskeletal  2. Movement Related Activities and Participation Affected:  1. Mobility  2. Self Care  3. Domestic Life   Number of elements that affect the Plan of Care: 4+: HIGH COMPLEXITY   CLINICAL PRESENTATION:   Presentation: Evolving clinical presentation with changing clinical characteristics: MODERATE COMPLEXITY   CLINICAL DECISION MAKIN [facility] Mobility Inpatient Short Form  How much difficulty does the patient currently have. .. Unable A Lot A Little None   1. Turning over in bed (including adjusting bedclothes, sheets and blankets)? [ ] 1   [X] 2   [ ] 3   [ ] 4   2. Sitting down on and standing up from a chair with arms ( e.g., wheelchair, bedside commode, etc.)   [ ] 1   [X] 2   [ ] 3   [ ] 4   3. Moving from lying on back to sitting on the side of the bed? [ ] 1   [X] 2   [ ] 3   [ ] 4   How much help from another person does the patient currently need. .. Total A Lot A Little None   4. Moving to and from a bed to a chair (including a wheelchair)? [X] 1   [ ] 2   [ ] 3   [ ] 4   5. Need to walk in hospital room? [X] 1   [ ] 2   [ ] 3   [ ] 4   6. Climbing 3-5 steps with a railing? [X] 1   [ ] 2   [ ] 3   [ ] 4   © , Trustees of [address] [place] [address] [address] [address], under license to [employer]. All rights reserved    Score:  Initial: 9 Most Recent: 9 (Date: 17 )     Interpretation of Tool:  Represents activities that are increasingly more difficult (i.e. Bed mobility, Transfers, Gait).        Score 24 23 22-20 19-15 14-10 9-7 6       Modifier CH CI CJ CK CL CM CN         · Mobility - Walking and Moving Around:  - CURRENT STATUS:    CM - 80%-99% impaired, limited or restricted               - GOAL STATUS:           CL - 60%-79% impaired, limited or restricted               - D/C STATUS:                       ---------------To be determined---------------  [place] / [place] / Product Type: Medicare /       Medical Necessity:     · Patient demonstrates fair rehab potential due to higher previous functional level. Reason for Services/Other Comments:  · Patient continues to require skilled intervention due to decreased functional mobility and balance/gait status from baseline. .   Use of outcome tool(s) and clinical judgement create a POC that gives a: Questionable prediction of patient's progress: MODERATE COMPLEXITY                 TREATMENT:      Pre-treatment Symptoms/Complaints:    Pain: Initial: Unable     Post Session:       Therapeutic Activity: (    25 Minutes): Therapeutic activities including bed mobility, transfer training, balance training, safety awareness training, and patient education to improve mobility, strength, balance and coordination. Required maximal   to promote static and dynamic balance in standing and promote coordination of bilateral, lower extremity(s). Therapeutic Exercise: (  0 minutes):  Exercises per grid below to improve mobility, strength and coordination. Required minimal visual, verbal and manual cues to promote proper body alignment and promote proper body posture. Progressed range and repetitions as indicated.      Date:  8/17/17 Date:   Date:     ACTIVITY/EXERCISE AM PM AM PM AM PM   Supine ankle pumps X 10 B, AA R        Supine heel slides X 10 B, AA R        Supine hip abd X 10 B, AA R        Supine SAQ X 10 B, AA R                                   B = bilateral; AA = active assistive; A = active; P = passive          Braces/Orthotics/Lines/Etc:   · room air  Treatment/Session Assessment:    · Response to Treatment: See above. · Interdisciplinary Collaboration:  · Physical Therapy Assistant and Registered Nurse  · After treatment position/precautions:  · Up in chair, Bed alarm/tab alert on, Bed/Chair-wheels locked, Call light within reach, RN notified and Family at bedside  · Compliance with Program/Exercises: Will assess as treatment progresses. · Recommendations/Intent for next treatment session: \"Next visit will focus on advancements to more challenging activities and reduction in assistance provided\".      Total Treatment Duration:PT Patient Time In/Time Out  Time In: 1336  Time Out: 2000 [facility]

## 2017-08-18 NOTE — WOUND CARE
Right lateral calcaneous with [id number] area of erythema/ purple not currently open. Right plantar calcaneous with 1x0.5cm area of deep purple discoloration, unknown etiology. Left boggy and with blanchable redness. Recommend offloading heels with rooke boots, if ok with surgeon or pillows while in bed. Will monitor.

## 2017-08-18 NOTE — PROGRESS NOTES
Hospitalist Progress Note    2017  Admit Date: 2017  3:16 PM   NAME: Gemini Lal   :  7/15/1933   MRN:  [id number]   Attending: Gayland Alpers, DO  PCP:  John Shaver MD    SUBJECTIVE:   Patient is an 80years old male with pmhx of parkinsons disease and recurrent falls, currently being admitted for right nondisplaced intertrochanteric fracture. Pt also has chronic thrombocytopenia without any signs of mucosal or submucosal bleeding. Ortho has been consulted, and plan for surgery today afternoon 8/15.    :  Seen at bedside. Looking somnolent and lethargic. Easy to arouse  No overnight events  Complaining of pain in right heel, hip and shoulder. Review of Systems negative with exception of pertinent positives noted above  PHYSICAL EXAM     Visit Vitals    /54 (BP 1 Location: Right arm, BP Patient Position: At rest)    Pulse 72    Temp 97.4 °F (36.3 °C)    Resp 17    SpO2 98%      Temp (24hrs), Av.8 °F (36.6 °C), Min:97.4 °F (36.3 °C), Max:98.4 °F (36.9 °C)    Oxygen Therapy  O2 Sat (%): 98 % (17 1157)  Pulse via Oximetry: 76 beats per minute (17 0507)  O2 Device: Room air (17 0507)  O2 Flow Rate (L/min): 2 l/min (17 1134)    Intake/Output Summary (Last 24 hours) at 17 1226  Last data filed at 17 0950   Gross per 24 hour   Intake                0 ml   Output              200 ml   Net             -200 ml      General: No acute distress, elderly, frail    Lungs:  CTA Bilaterally. Heart:  Regular rate and rhythm,  No murmur, rub, or gallop  Abdomen: Soft, Non distended, Non tender, Positive bowel sounds  Extremities: No cyanosis, clubbing or edema, surgical dressing on right hip, bruise over right shoulder.  Right heel skin breakdown  Neurologic:  gcs 15, no motor or sensory deficits, CN 2-12 intact  Psych:             AO x 1, mood and affect anxious    ASSESSMENT      Active Hospital Problems    Diagnosis Date Noted    Fracture closed, femur, shaft ([facility] [address] 08/14/2017    Fracture 08/14/2017     Plan:  · S/p ORIF for right intertrochanteric hip fracture: POD # 3  · Post op care and pain management as per ortho  · Acute post op  Blood loss anemia: inadequate response to 1 unit of PRBC,  Hb 7.7 from 7.4, will transfuse 1 more unit today. FOBT ordered  · Wound care consult  · Will order urine culture, and start on empiric rocephin. · PT/OT eval   · PPD  · Continue other home meds as reconciled in MAR. · Risk high with opioids on board. Pt has a bed at [facility], medically not cleared yet.     DVT Prophylaxis: SCD and TEDs    Signed By: Elijah Owen MD     August 18, 2017

## 2017-08-18 NOTE — PROGRESS NOTES
Care Management Interventions  Mode of Transport at Discharge: S  Transition of Care Consult (CM Consult): Discharge Planning  Discharge Durable Medical Equipment: No  Physical Therapy Consult: Yes  Occupational Therapy Consult: Yes  Speech Therapy Consult: Yes  Current Support Network: Own Home, Lives with Spouse, Family Lives Nearby  Confirm Follow Up Transport: Family  Plan discussed with Pt/Family/Caregiver: Yes  Freedom of Choice Offered: Yes  Discharge Location  Discharge Placement: Rehab Unit Subacute ([facility])     Pt is not medically ready for discharge today. Facility notified and bed will remain available for Saturday if pt is cleared for discharge. Pt's spouse and family friends are at the bedside and aware of this information.

## 2017-08-19 LAB
ABO + RH BLD: NORMAL
ANION GAP SERPL CALC-SCNC: 8 MMOL/L (ref 7–16)
BLD PROD TYP BPU: NORMAL
BLOOD GROUP ANTIBODIES SERPL: NORMAL
BPU ID: NORMAL
BUN SERPL-MCNC: 24 MG/DL (ref 8–23)
CALCIUM SERPL-MCNC: 9.1 MG/DL (ref 8.3–10.4)
CHLORIDE SERPL-SCNC: 110 MMOL/L (ref 98–107)
CO2 SERPL-SCNC: 25 MMOL/L (ref 21–32)
CREAT SERPL-MCNC: 0.93 MG/DL (ref 0.8–1.5)
CROSSMATCH RESULT,%XM: NORMAL
ERYTHROCYTE [DISTWIDTH] IN BLOOD BY AUTOMATED COUNT: 15.7 % (ref 11.9–14.6)
GLUCOSE BLD STRIP.AUTO-MCNC: 128 MG/DL (ref 65–100)
GLUCOSE BLD STRIP.AUTO-MCNC: 158 MG/DL (ref 65–100)
GLUCOSE BLD STRIP.AUTO-MCNC: 173 MG/DL (ref 65–100)
GLUCOSE BLD STRIP.AUTO-MCNC: 187 MG/DL (ref 65–100)
GLUCOSE SERPL-MCNC: 138 MG/DL (ref 65–100)
HCT VFR BLD AUTO: 26.1 % (ref 41.1–50.3)
HGB BLD-MCNC: 8.9 G/DL (ref 13.6–17.2)
MCH RBC QN AUTO: 29.5 PG (ref 26.1–32.9)
MCHC RBC AUTO-ENTMCNC: 34.1 G/DL (ref 31.4–35)
MCV RBC AUTO: 86.4 FL (ref 79.6–97.8)
PLATELET # BLD AUTO: 54 K/UL (ref 150–450)
PMV BLD AUTO: 10.1 FL (ref 10.8–14.1)
POTASSIUM SERPL-SCNC: 4 MMOL/L (ref 3.5–5.1)
RBC # BLD AUTO: 3.02 M/UL (ref 4.23–5.67)
SODIUM SERPL-SCNC: 143 MMOL/L (ref 136–145)
SPECIMEN EXP DATE BLD: NORMAL
STATUS OF UNIT,%ST: NORMAL
UNIT DIVISION, %UDIV: 0
WBC # BLD AUTO: 2.2 K/UL (ref 4.3–11.1)

## 2017-08-19 PROCEDURE — 82962 GLUCOSE BLOOD TEST: CPT

## 2017-08-19 PROCEDURE — 74011250636 HC RX REV CODE- 250/636: Performed by: HOSPITALIST

## 2017-08-19 PROCEDURE — 36415 COLL VENOUS BLD VENIPUNCTURE: CPT | Performed by: HOSPITALIST

## 2017-08-19 PROCEDURE — 80048 BASIC METABOLIC PNL TOTAL CA: CPT | Performed by: HOSPITALIST

## 2017-08-19 PROCEDURE — 85027 COMPLETE CBC AUTOMATED: CPT | Performed by: HOSPITALIST

## 2017-08-19 PROCEDURE — 74011250637 HC RX REV CODE- 250/637: Performed by: NURSE PRACTITIONER

## 2017-08-19 PROCEDURE — 65270000029 HC RM PRIVATE

## 2017-08-19 PROCEDURE — 97110 THERAPEUTIC EXERCISES: CPT

## 2017-08-19 PROCEDURE — 74011000258 HC RX REV CODE- 258: Performed by: HOSPITALIST

## 2017-08-19 PROCEDURE — 74011250637 HC RX REV CODE- 250/637: Performed by: HOSPITALIST

## 2017-08-19 PROCEDURE — 74011250636 HC RX REV CODE- 250/636: Performed by: NURSE PRACTITIONER

## 2017-08-19 PROCEDURE — 77030031642 HC BOOT FT ROOKE HFS OSBM -B

## 2017-08-19 PROCEDURE — C9113 INJ PANTOPRAZOLE SODIUM, VIA: HCPCS | Performed by: HOSPITALIST

## 2017-08-19 PROCEDURE — 74011636637 HC RX REV CODE- 636/637: Performed by: HOSPITALIST

## 2017-08-19 PROCEDURE — 97530 THERAPEUTIC ACTIVITIES: CPT

## 2017-08-19 RX ADMIN — INSULIN LISPRO 2 UNITS: 100 INJECTION, SOLUTION INTRAVENOUS; SUBCUTANEOUS at 12:36

## 2017-08-19 RX ADMIN — PANTOPRAZOLE SODIUM 40 MG: 40 INJECTION, POWDER, FOR SOLUTION INTRAVENOUS at 08:33

## 2017-08-19 RX ADMIN — ACETAMINOPHEN 650 MG: 325 TABLET, FILM COATED ORAL at 05:09

## 2017-08-19 RX ADMIN — CALCIUM CARBONATE 500 MG (1,250 MG)-VITAMIN D3 200 UNIT TABLET 1 TABLET: at 17:18

## 2017-08-19 RX ADMIN — Medication 10 ML: at 05:09

## 2017-08-19 RX ADMIN — RIFAXIMIN 550 MG: 550 TABLET ORAL at 17:18

## 2017-08-19 RX ADMIN — DOCUSATE SODIUM 100 MG: 100 CAPSULE, LIQUID FILLED ORAL at 08:32

## 2017-08-19 RX ADMIN — PANTOPRAZOLE SODIUM 40 MG: 40 INJECTION, POWDER, FOR SOLUTION INTRAVENOUS at 21:19

## 2017-08-19 RX ADMIN — Medication 10 ML: at 21:19

## 2017-08-19 RX ADMIN — ALPRAZOLAM 2 MG: 0.5 TABLET ORAL at 17:18

## 2017-08-19 RX ADMIN — FERROUS SULFATE TAB 325 MG (65 MG ELEMENTAL FE) 325 MG: 325 (65 FE) TAB at 17:18

## 2017-08-19 RX ADMIN — INSULIN LISPRO 2 UNITS: 100 INJECTION, SOLUTION INTRAVENOUS; SUBCUTANEOUS at 17:17

## 2017-08-19 RX ADMIN — INSULIN LISPRO 2 UNITS: 100 INJECTION, SOLUTION INTRAVENOUS; SUBCUTANEOUS at 08:38

## 2017-08-19 RX ADMIN — LACTULOSE 10 G: 20 SOLUTION ORAL at 08:33

## 2017-08-19 RX ADMIN — OXYCODONE HYDROCHLORIDE 5 MG: 5 TABLET ORAL at 14:32

## 2017-08-19 RX ADMIN — RIFAXIMIN 550 MG: 550 TABLET ORAL at 08:32

## 2017-08-19 RX ADMIN — CALCIUM CARBONATE 500 MG (1,250 MG)-VITAMIN D3 200 UNIT TABLET 1 TABLET: at 12:35

## 2017-08-19 RX ADMIN — ENOXAPARIN SODIUM 30 MG: 30 INJECTION SUBCUTANEOUS at 14:32

## 2017-08-19 RX ADMIN — CALCIUM CARBONATE 500 MG (1,250 MG)-VITAMIN D3 200 UNIT TABLET 1 TABLET: at 08:32

## 2017-08-19 RX ADMIN — ALLOPURINOL 100 MG: 100 TABLET ORAL at 08:32

## 2017-08-19 RX ADMIN — ALPRAZOLAM 2 MG: 0.5 TABLET ORAL at 08:32

## 2017-08-19 RX ADMIN — CYANOCOBALAMIN TAB 1000 MCG 1000 MCG: 1000 TAB at 08:32

## 2017-08-19 RX ADMIN — FERROUS SULFATE TAB 325 MG (65 MG ELEMENTAL FE) 325 MG: 325 (65 FE) TAB at 08:32

## 2017-08-19 RX ADMIN — ACETAMINOPHEN 650 MG: 325 TABLET, FILM COATED ORAL at 21:19

## 2017-08-19 RX ADMIN — OXYCODONE HYDROCHLORIDE 5 MG: 5 TABLET ORAL at 23:26

## 2017-08-19 RX ADMIN — ACETAMINOPHEN 650 MG: 325 TABLET, FILM COATED ORAL at 12:36

## 2017-08-19 RX ADMIN — Medication 100 MG: at 08:32

## 2017-08-19 RX ADMIN — DOCUSATE SODIUM 100 MG: 100 CAPSULE, LIQUID FILLED ORAL at 17:18

## 2017-08-19 RX ADMIN — Medication 5 ML: at 14:35

## 2017-08-19 RX ADMIN — CEFTRIAXONE 1 G: 1 INJECTION, POWDER, FOR SOLUTION INTRAMUSCULAR; INTRAVENOUS at 12:36

## 2017-08-19 RX ADMIN — OXYCODONE HYDROCHLORIDE 5 MG: 5 TABLET ORAL at 08:37

## 2017-08-19 NOTE — PROGRESS NOTES
Problem: Falls - Risk of  Goal: *Absence of Falls  Document Matheus Fall Risk and appropriate interventions in the flowsheet.    Outcome: Progressing Towards Goal  Fall Risk Interventions:  Mobility Interventions: Bed/chair exit alarm, Patient to call before getting OOB     Mentation Interventions: Bed/chair exit alarm, Door open when patient unattended     Medication Interventions: Bed/chair exit alarm, Patient to call before getting OOB     Elimination Interventions: Bed/chair exit alarm, Call light in reach, Patient to call for help with toileting needs     History of Falls Interventions: Bed/chair exit alarm, Door open when patient unattended

## 2017-08-19 NOTE — PROGRESS NOTES
[address] FRACTURE PROGRESS NOTE    2017  Admit Date:   2017    Post Op day: 3 Days Post-Op    Subjective:    Dieter Ramos PATIENT RESTING IN BED     PT/OT:   Gait:                    Vital Signs:    Patient Vitals for the past 8 hrs:   BP Temp Pulse Resp SpO2   17 1940 116/60 97.5 °F (36.4 °C) 79 12 96 %   17 1840 113/55 98.1 °F (36.7 °C) 82 16 93 %   17 1735 114/65 98 °F (36.7 °C) 87 15 96 %   17 1711 133/64 96.5 °F (35.8 °C) 84 14 95 %   17 1617 111/56 97.5 °F (36.4 °C) 76 16 96 %     Temp (24hrs), Av.6 °F (36.4 °C), Min:96.5 °F (35.8 °C), Max:98.1 °F (36.7 °C)      Pain Control:   Pain Assessment  Pain Scale 1: Visual  Pain Intensity 1: 0  Pain Onset 1: post op  Pain Location 1: Leg, Shoulder  Pain Orientation 1: Right  Pain Description 1: Aching, Sore  Pain Intervention(s) 1: Repositioned    Meds:    Current Facility-Administered Medications   Medication Dose Route Frequency    0.9% sodium chloride infusion 250 mL  250 mL IntraVENous PRN    0.9% sodium chloride infusion 250 mL  250 mL IntraVENous PRN    cefTRIAXone (ROCEPHIN) 1 g in 0.9% sodium chloride (MBP/ADV) 50 mL  1 g IntraVENous Q24H    rifAXIMin (XIFAXAN) tablet 550 mg  550 mg Oral BID    sodium chloride (NS) flush 5-10 mL  5-10 mL IntraVENous Q8H    sodium chloride (NS) flush 5-10 mL  5-10 mL IntraVENous PRN    acetaminophen (TYLENOL) tablet 650 mg  650 mg Oral Q8H    oxyCODONE IR (ROXICODONE) tablet 5 mg  5 mg Oral Q4H PRN    ondansetron (ZOFRAN) injection 4 mg  4 mg IntraVENous Q4H PRN    docusate sodium (COLACE) capsule 100 mg  100 mg Oral BID    alum-mag hydroxide-simeth (MYLANTA) oral suspension 30 mL  30 mL Oral Q4H PRN    calcium-vitamin D (OS-KIMBERLEY) 500 mg-200 unit tablet  1 Tab Oral TID WITH MEALS    enoxaparin (LOVENOX) injection 30 mg  30 mg SubCUTAneous Q24H    allopurinol (ZYLOPRIM) tablet 100 mg  100 mg Oral DAILY    ALPRAZolam (XANAX) tablet 2 mg  2 mg Oral BID    cyanocobalamin tablet 1,000 mcg  1,000 mcg Oral DAILY    ferrous sulfate tablet 325 mg  1 Tab Oral BID WITH MEALS    lactulose (CHRONULAC) solution 10 g  10 g Oral DAILY    thiamine (B-1) tablet 100 mg  100 mg Oral DAILY    pantoprazole (PROTONIX) injection 40 mg  40 mg IntraVENous Q12H    insulin lispro (HUMALOG) injection   SubCUTAneous TIDAC    dextrose 5% and 0.9% NaCl infusion  75 mL/hr IntraVENous CONTINUOUS    traMADol (ULTRAM) tablet 50 mg  50 mg Oral Q6H PRN    oxyCODONE IR (ROXICODONE) tablet 5 mg  5 mg Oral Q4H PRN       LAB:    Recent Labs      08/18/17   0629   HCT  22.2*   HGB  7.7*       24 Hour Assessment Issues:    Disoriented (baseline)    Discharge Planning: SNF    Transfuse PRBC's:      Assessment & Physician's Comment:  Dressing is clean, dry, and intact  Neurovascular checks within normal limits    Active Problems:    Fracture closed, femur, shaft ([facility] [address] (8/14/2017)      Fracture (8/14/2017)        Plan:  [id number] [facility] SN[facility] FOR REHAB      Jerardo Alcantar NP

## 2017-08-19 NOTE — PROGRESS NOTES
Problem: Falls - Risk of  Goal: *Absence of Falls  Document Matheus Fall Risk and appropriate interventions in the flowsheet.    Outcome: Progressing Towards Goal  Fall Risk Interventions:  Mobility Interventions: Bed/chair exit alarm     Mentation Interventions: Bed/chair exit alarm, Door open when patient unattended, Toileting rounds     Medication Interventions: Bed/chair exit alarm     Elimination Interventions: Bed/chair exit alarm, Call light in reach, Toileting schedule/hourly rounds     History of Falls Interventions: Bed/chair exit alarm, Door open when patient unattended

## 2017-08-19 NOTE — PROGRESS NOTES
Problem: Mobility Impaired (Adult and Pediatric)  Goal: *Acute Goals and Plan of Care (Insert Text)  STG:  (1.)Mr. Sony Galicia will perform bed mobility with MODERATE ASSIST within 3 day(s). (2.)Mr. Sony Galicia will transfer from bed to chair and chair to bed with MODERATE ASSIST using the least restrictive device within 3 day(s). (3.)Mr. Sony Galicia will ambulate with MODERATE ASSIST for 10+ feet with the least restrictive device within 3 day(s). (4.)Mr. Sony Galicia will tolerate 15+ minutes of therapeutic activity/exercise while maintaining stable vitals to improve functional strength and endurance within 3 day(s). LTG:  (1.)Mr. Sony Galicia will perform bed mobility with MINIMAL ASSIST within 7 day(s). (2.)Mr. Sony Galicia will transfer from bed to chair and chair to bed with MINIMAL ASSIST using the least restrictive device within 7 day(s). (3.)Mr. Sony Galicia will ambulate with MINIMAL ASSIST for 25+ feet with the least restrictive device within 7 day(s). (4.)Mr. Sony Galicia will tolerate 25+ minutes of therapeutic activity/exercise while maintaining stable vitals to improve functional strength and endurance within 7 day(s).   ________________________________________________________________________________________________      PHYSICAL THERAPY: Daily Note, Treatment Day: 3rd and AM    8/19/2017  INPATIENT: Hospital Day: 6  [place] / Product Type: Medicare /       Aadma POLK     NAME/AGE/GENDER: John Friday is a 80 y.o. male        PRIMARY DIAGNOSIS: Intertrochanteric fracture of right femur, closed, initial encounter ([facility] Abe Chew <principal problem not specified> <principal problem not specified>  Procedure(s) (LRB):  RIGHT FEMUR INSERTION INTRA MEDULLARY NAIL (Right)  4 Days Post-Op  ICD-10: Treatment Diagnosis:       · Generalized Muscle Weakness (M62.81)  · Difficulty in walking, Not elsewhere classified (R26.2)  · History of falling (Z91.81)   Precaution/Allergies:  Review of patient's allergies indicates no known allergies. ASSESSMENT:      Mr. vEe Cohen is a 80year old male s/p right femoral IM nailing and is WBAT R LE. Patient lives with his spouse in a single story residence with steps to enter. At baseline, patient has a history significant for Parkinson's Disease as well as being legally blind and hard of hearing per chart. Mr. Eve Cohen endorses frequent falls at home, requires assistance with ADLs from spouse, and ambulates household distances with a SPC. Pt was supine and agreeable to get OOB with PT. Pt performed supine to sit with max assist.  Pt with posterior lean in sitting and required increased time and cues to correct. Pt required max assist x 2 to stand and transfer to the chair. Pt is leaning posteriorly today than when seen by this therapist on Thursday. He is also much less talkative and slower to respond to cues. Pt was left up in chair with needs in reach. No progress with mobility today. Will continue with PT efforts. This section established at most recent assessment   PROBLEM LIST (Impairments causing functional limitations):  1. Decreased Strength  2. Decreased ADL/Functional Activities  3. Decreased Transfer Abilities  4. Decreased Ambulation Ability/Technique  5. Decreased Balance  6. Increased Pain  7. Decreased Activity Tolerance  8. Increased Shortness of Breath  9. Decreased Flexibility/Joint Mobility  10. Decreased Knowledge of Precautions  11. Decreased [place] with Home Exercise Program  12. Decreased Cognition    INTERVENTIONS PLANNED: (Benefits and precautions of physical therapy have been discussed with the patient.)  1. Balance Exercise  2. Bed Mobility  3. Family Education  4. Gait Training  5. Home Exercise Program (HEP)  6. Range of Motion (ROM)  7. Therapeutic Activites  8. Ultrasound (US)  9.  Group Therapy      TREATMENT PLAN: Frequency/Duration: twice daily for duration of hospital stay  Rehabilitation Potential For Stated Goals: FAIR      RECOMMENDED REHABILITATION/EQUIPMENT: (at time of discharge pending progress): Continue Skilled Therapy. HISTORY:   History of Present Injury/Illness (Reason for Referral):  S/p right femoral IM nailing  Past Medical History/Comorbidities:   Mr. Carmelo Casiano  has a past medical history of Alcohol abuse; AVM (arteriovenous malformation) of colon; Diabetes ([address] Hypertension; Liver disease; Macular degeneration; Other ill-defined conditions; Portal hypertension ([address] Psychiatric disorder; PUD (peptic ulcer disease); Thrombocytopenia ([address] and Varices, esophageal ([address] Mr. Carmelo Casiano  has a past surgical history that includes orthopaedic (1999) and other surgical.  Social History/Living Environment:   Home Environment: Private residence  One/Two Story Residence: One story  Living Alone: No  Support Systems: Spouse/Significant Other/Partner  Patient Expects to be Discharged to[de-identified] Unknown  Current DME Used/Available at Home: Cane, straight, Walker  Tub or Shower Type: Shower  Prior Level of Function/Work/Activity:   Patient lives with his spouse in a single story residence with steps to enter. At baseline, patient has a history significant for Parkinson's Disease with frequent falls, requiring assistance with ADLs from spouse, and ambulates household distances with a SPC.    Number of Personal Factors/Comorbidities that affect the Plan of Care: 1-2: MODERATE COMPLEXITY   EXAMINATION:   Most Recent Physical Functioning:   Gross Assessment:                  Posture:     Balance:  Sitting - Static: Fair (occasional)  Sitting - Dynamic: Fair (occasional)  Standing - Static: Poor  Standing - Dynamic : Poor Bed Mobility:  Supine to Sit: Maximum assistance  Scooting: Maximum assistance  Wheelchair Mobility:     Transfers:  Sit to Stand: Maximum assistance;Assist x2  Stand to Sit: Maximum assistance;Assist x2  Stand Pivot Transfers: Maximum assistance  Bed to Chair: Assist x2  Gait:             Body Structures Involved:  1. Bones  2. Joints  3. Muscles  4. Ligaments Body Functions Affected:  1. Neuromusculoskeletal  2. Movement Related Activities and Participation Affected:  1. Mobility  2. Self Care  3. Domestic Life   Number of elements that affect the Plan of Care: 4+: HIGH COMPLEXITY   CLINICAL PRESENTATION:   Presentation: Evolving clinical presentation with changing clinical characteristics: MODERATE COMPLEXITY   CLINICAL DECISION MAKIN [facility] Mobility Inpatient Short Form  How much difficulty does the patient currently have. .. Unable A Lot A Little None   1. Turning over in bed (including adjusting bedclothes, sheets and blankets)? [ ] 1   [X] 2   [ ] 3   [ ] 4   2. Sitting down on and standing up from a chair with arms ( e.g., wheelchair, bedside commode, etc.)   [ ] 1   [X] 2   [ ] 3   [ ] 4   3. Moving from lying on back to sitting on the side of the bed? [ ] 1   [X] 2   [ ] 3   [ ] 4   How much help from another person does the patient currently need. .. Total A Lot A Little None   4. Moving to and from a bed to a chair (including a wheelchair)? [X] 1   [ ] 2   [ ] 3   [ ] 4   5. Need to walk in hospital room? [X] 1   [ ] 2   [ ] 3   [ ] 4   6. Climbing 3-5 steps with a railing? [X] 1   [ ] 2   [ ] 3   [ ] 4   © , Trustees of [address] [place] [address], under license to [employer]. All rights reserved    Score:  Initial: 9 Most Recent: 9 (Date: 17 )     Interpretation of Tool:  Represents activities that are increasingly more difficult (i.e. Bed mobility, Transfers, Gait).        Score 24 23 22-20 19-15 14-10 9-7 6       Modifier CH CI CJ CK CL CM CN         · Mobility - Walking and Moving Around:               - CURRENT STATUS:    CM - 80%-99% impaired, limited or restricted               - GOAL STATUS:           CL - 60%-79% impaired, limited or restricted               - D/C STATUS:                       ---------------To be determined---------------  [place] / Product Type: Medicare /       Medical Necessity:     · Patient demonstrates fair rehab potential due to higher previous functional level. Reason for Services/Other Comments:  · Patient continues to require skilled intervention due to decreased functional mobility and balance/gait status from baseline. .   Use of outcome tool(s) and clinical judgement create a POC that gives a: Questionable prediction of patient's progress: MODERATE COMPLEXITY                 TREATMENT:      Pre-treatment Symptoms/Complaints:    Pain: Initial: Unable     Post Session:       Therapeutic Activity: (    16 Minutes): Therapeutic activities including bed mobility, transfer training, balance training, safety awareness training, and patient education to improve mobility, strength, balance and coordination. Required maximal   to promote static and dynamic balance in standing and promote coordination of bilateral, lower extremity(s). Therapeutic Exercise: (  0 minutes):  Exercises per grid below to improve mobility, strength and coordination. Required minimal visual, verbal and manual cues to promote proper body alignment and promote proper body posture. Progressed range and repetitions as indicated. Date:  8/17/17 Date:   Date:     ACTIVITY/EXERCISE AM PM AM PM AM PM   Supine ankle pumps X 10 B, AA R        Supine heel slides X 10 B, AA R        Supine hip abd X 10 B, AA R        Supine SAQ X 10 B, AA R                                   B = bilateral; AA = active assistive; A = active; P = passive          Braces/Orthotics/Lines/Etc:   · room air  Treatment/Session Assessment:    · Response to Treatment:  See above.   · Interdisciplinary Collaboration:  · Physical Therapy Assistant and Registered Nurse  · After treatment position/precautions:  · Up in chair, Bed alarm/tab alert on, Bed/Chair-wheels locked, Call light within reach, RN notified and Family at bedside  · Compliance with Program/Exercises: Will assess as treatment progresses. · Recommendations/Intent for next treatment session: \"Next visit will focus on advancements to more challenging activities and reduction in assistance provided\".      Total Treatment Duration:PT Patient Time In/Time Out  Time In: 6870  Time Out: 052 [place], [place]

## 2017-08-19 NOTE — PROGRESS NOTES
Hospitalist Progress Note    2017  Admit Date: 2017  3:16 PM   NAME: Fer Garcia   :  7/15/1933   MRN:  [id number]   Attending: Flip Zapata DO  PCP:  Fatimah Robison MD    SUBJECTIVE:   Patient is an 80years old male with pmhx of parkinsons disease and recurrent falls, currently being admitted for right nondisplaced intertrochanteric fracture. Pt also has chronic thrombocytopenia without any signs of mucosal or submucosal bleeding. Ortho has been consulted, and plan for surgery today afternoon 8/15.    :  Seen sitting in recliner after completing PT  \"feeling better\"  Complains of persistent pain in right heel. As per the nurse, he finished 75% of his breakfast    Review of Systems negative with exception of pertinent positives noted above  PHYSICAL EXAM     Visit Vitals    BP 91/52 (BP 1 Location: Left arm, BP Patient Position: At rest)    Pulse 82    Temp 97.9 °F (36.6 °C)    Resp 18    SpO2 97%      Temp (24hrs), Av.6 °F (36.4 °C), Min:96.5 °F (35.8 °C), Max:98.1 °F (36.7 °C)    Oxygen Therapy  O2 Sat (%): 97 % (17 0402)  Pulse via Oximetry: 82 beats per minute (17 0402)  O2 Device: Room air (17 0402)  O2 Flow Rate (L/min): 2 l/min (17 1134)    Intake/Output Summary (Last 24 hours) at 17 0809  Last data filed at 17 1600   Gross per 24 hour   Intake                0 ml   Output              600 ml   Net             -600 ml      General: No acute distress, elderly, frail    Lungs:  CTA Bilaterally. Heart:  Regular rate and rhythm,  No murmur, rub, or gallop  Abdomen: Soft, Non distended, Non tender, Positive bowel sounds  Extremities: No cyanosis, clubbing or edema, surgical dressing on right hip, bruise over right shoulder.  Blanchable redness on left hel, with nonblanchable  2x2cm reddish purple area on right heel  Neurologic:  gcs 15, no motor or sensory deficits, CN 2-12 intact  Psych:             AO x 1, mood and affect anxious    ASSESSMENT      Active Hospital Problems    Diagnosis Date Noted    Fracture closed, femur, shaft ([facility] 08/14/2017    Fracture 08/14/2017     Plan:  · S/p ORIF for right intertrochanteric hip fracture: POD # 4  · Post op care and pain management as per ortho  · S/p 2 units PRBC, Hb is 8.9  · rooke boots for bilateral heal pressure sores. · Urine culture negative so far, will d/c rocephin after today's dose. · PT/OT eval   · PPD  · Continue other home meds as reconciled in MAR. · Risk high with opioids on board. Pt has a bed at [facility], possible discharge tomorrow.     DVT Prophylaxis: lovenox    Signed By: Arline Reyes MD     August 19, 2017

## 2017-08-20 VITALS
DIASTOLIC BLOOD PRESSURE: 53 MMHG | HEART RATE: 82 BPM | TEMPERATURE: 97.7 F | OXYGEN SATURATION: 92 % | RESPIRATION RATE: 16 BRPM | SYSTOLIC BLOOD PRESSURE: 99 MMHG

## 2017-08-20 LAB
BACTERIA SPEC CULT: NORMAL
GLUCOSE BLD STRIP.AUTO-MCNC: 168 MG/DL (ref 65–100)
GLUCOSE BLD STRIP.AUTO-MCNC: 200 MG/DL (ref 65–100)
SERVICE CMNT-IMP: NORMAL

## 2017-08-20 PROCEDURE — 74011250637 HC RX REV CODE- 250/637: Performed by: HOSPITALIST

## 2017-08-20 PROCEDURE — 74011250637 HC RX REV CODE- 250/637: Performed by: ORTHOPAEDIC SURGERY

## 2017-08-20 PROCEDURE — 74011250636 HC RX REV CODE- 250/636: Performed by: HOSPITALIST

## 2017-08-20 PROCEDURE — 74011636637 HC RX REV CODE- 636/637: Performed by: HOSPITALIST

## 2017-08-20 PROCEDURE — 82962 GLUCOSE BLOOD TEST: CPT

## 2017-08-20 PROCEDURE — 74011250637 HC RX REV CODE- 250/637: Performed by: NURSE PRACTITIONER

## 2017-08-20 PROCEDURE — 97535 SELF CARE MNGMENT TRAINING: CPT

## 2017-08-20 PROCEDURE — C9113 INJ PANTOPRAZOLE SODIUM, VIA: HCPCS | Performed by: HOSPITALIST

## 2017-08-20 PROCEDURE — 97530 THERAPEUTIC ACTIVITIES: CPT

## 2017-08-20 RX ORDER — FERROUS SULFATE, DRIED 160(50) MG
1 TABLET, EXTENDED RELEASE ORAL
Qty: 90 TAB | Refills: 2 | Status: SHIPPED | OUTPATIENT
Start: 2017-08-20 | End: 2017-09-19

## 2017-08-20 RX ORDER — DOCUSATE SODIUM 100 MG/1
100 CAPSULE, LIQUID FILLED ORAL 2 TIMES DAILY
Qty: 60 CAP | Refills: 2 | Status: SHIPPED | OUTPATIENT
Start: 2017-08-20 | End: 2017-11-18

## 2017-08-20 RX ORDER — TRAMADOL HYDROCHLORIDE 50 MG/1
50 TABLET ORAL
Qty: 40 TAB | Refills: 1 | Status: SHIPPED | OUTPATIENT
Start: 2017-08-20 | End: 2019-02-05

## 2017-08-20 RX ORDER — HYDROCODONE BITARTRATE AND ACETAMINOPHEN 5; 325 MG/1; MG/1
1 TABLET ORAL
Qty: 20 TAB | Refills: 0 | Status: SHIPPED | OUTPATIENT
Start: 2017-08-20 | End: 2019-02-05

## 2017-08-20 RX ORDER — ENOXAPARIN SODIUM 100 MG/ML
30 INJECTION SUBCUTANEOUS EVERY 24 HOURS
Qty: 8.4 ML | Refills: 0 | Status: SHIPPED | OUTPATIENT
Start: 2017-08-20 | End: 2017-09-17

## 2017-08-20 RX ORDER — ALPRAZOLAM 2 MG/1
2 TABLET ORAL 2 TIMES DAILY
Qty: 20 TAB | Refills: 0 | Status: SHIPPED | OUTPATIENT
Start: 2017-08-20 | End: 2019-02-05

## 2017-08-20 RX ADMIN — Medication 100 MG: at 08:57

## 2017-08-20 RX ADMIN — ALLOPURINOL 100 MG: 100 TABLET ORAL at 08:57

## 2017-08-20 RX ADMIN — TRAMADOL HYDROCHLORIDE 50 MG: 50 TABLET, FILM COATED ORAL at 08:57

## 2017-08-20 RX ADMIN — INSULIN LISPRO 2 UNITS: 100 INJECTION, SOLUTION INTRAVENOUS; SUBCUTANEOUS at 09:09

## 2017-08-20 RX ADMIN — ALPRAZOLAM 2 MG: 0.5 TABLET ORAL at 08:57

## 2017-08-20 RX ADMIN — CALCIUM CARBONATE 500 MG (1,250 MG)-VITAMIN D3 200 UNIT TABLET 1 TABLET: at 08:57

## 2017-08-20 RX ADMIN — OXYCODONE HYDROCHLORIDE 5 MG: 5 TABLET ORAL at 11:02

## 2017-08-20 RX ADMIN — OXYCODONE HYDROCHLORIDE 5 MG: 5 TABLET ORAL at 04:58

## 2017-08-20 RX ADMIN — ACETAMINOPHEN 650 MG: 325 TABLET, FILM COATED ORAL at 04:58

## 2017-08-20 RX ADMIN — PANTOPRAZOLE SODIUM 40 MG: 40 INJECTION, POWDER, FOR SOLUTION INTRAVENOUS at 08:58

## 2017-08-20 RX ADMIN — RIFAXIMIN 550 MG: 550 TABLET ORAL at 08:57

## 2017-08-20 RX ADMIN — INSULIN LISPRO 4 UNITS: 100 INJECTION, SOLUTION INTRAVENOUS; SUBCUTANEOUS at 12:14

## 2017-08-20 RX ADMIN — FERROUS SULFATE TAB 325 MG (65 MG ELEMENTAL FE) 325 MG: 325 (65 FE) TAB at 08:57

## 2017-08-20 RX ADMIN — LACTULOSE 10 G: 20 SOLUTION ORAL at 08:56

## 2017-08-20 RX ADMIN — CYANOCOBALAMIN TAB 1000 MCG 1000 MCG: 1000 TAB at 08:58

## 2017-08-20 NOTE — PROGRESS NOTES
Problem: Self Care Deficits Care Plan (Adult)  Goal: *Acute Goals and Plan of Care (Insert Text)  1. Patient will maintain WBAT status in RLE for 100% of treatment session and minimal verbal cues from therapist.   2. Patient will complete functional transfers with minimal assistance while maintaining WBAT status in RLE and with adaptive equipment as needed. 3. Patient will complete lower body bathing and dressing with minimal assistance and adaptive equipment as needed. 4. Patient will complete toileting and toilet transfer with minimal assistance. 5. Patient will tolerate 23 minutes of OT treatment with less than 3 rest breaks to increase activity tolerance for ADLs. 6. Patient will participate in at least 23 minutes of BUE therapeutic exercises to strengthen BUE for functional transfers. Timeframe: 7 visits     Comments:       OCCUPATIONAL THERAPY: Daily Note, Treatment Day: 2nd and AM    8/20/2017  INPATIENT: Hospital Day: 7  [place] / Product Type: Medicare /      NAME/AGE/GENDER: Marleen Barnes is a 80 y.o. male        PRIMARY DIAGNOSIS:  Intertrochanteric fracture of right femur, closed, initial encounter ([facility] Melonie Sexton <principal problem not specified> <principal problem not specified>  Procedure(s) (LRB):  RIGHT FEMUR INSERTION INTRA MEDULLARY NAIL (Right)  5 Days Post-Op  ICD-10: Treatment Diagnosis:        · Pain in Right Hip (M25.551)  · Stiffness of Right Hip, Not elsewhere classified (M25.651)  · Generalized Muscle Weakness (M62.81)  · Repeated Falls (R29.6)  · History of falling (Z91.81)   Precautions/Allergies:        R WBAT Review of patient's allergies indicates no known allergies. ASSESSMENT:      Mr. Jaren Clifford presents to hospital for s/p above procedure. He lives with his wife at baseline in a [facility] with steps to enter. He has a PMHx which includes Parkinson's, legal blindness, and hearing aid use.  Pt completed grooming with the assistance listed below while sitting in chair. Continue OT. This section established at most recent assessment   PROBLEM LIST (Impairments causing functional limitations):  1. Decreased Strength  2. Decreased ADL/Functional Activities  3. Decreased Transfer Abilities  4. Decreased Ambulation Ability/Technique  5. Decreased Balance  6. Increased Pain  7. Decreased Activity Tolerance  8. Decreased Work Simplification/Energy Conservation Techniques  9. Increased Fatigue  10. Decreased Flexibility/Joint Mobility  11. Edema/Girth  12. Decreased Knowledge of Precautions  13. Decreased [place] with Home Exercise Program  14. Decreased Cognition    INTERVENTIONS PLANNED: (Benefits and precautions of occupational therapy have been discussed with the patient.)  1. Activities of daily living training  2. Adaptive equipment training  3. Balance training  4. Clothing management  5. Cognitive training  6. Donning&doffing training  7. Group therapy  8. Therapeutic activity  9. Therapeutic exercise      TREATMENT PLAN: Frequency/Duration: Follow patient 6x/week to address above goals. Rehabilitation Potential For Stated Goals: FAIR      RECOMMENDED REHABILITATION/EQUIPMENT: (at time of discharge pending progress): Continue Skilled Therapy. OCCUPATIONAL PROFILE AND HISTORY:   History of Present Injury/Illness (Reason for Referral):  See H&P  Past Medical History/Comorbidities:   Mr. Jerman Osorio  has a past medical history of Alcohol abuse; AVM (arteriovenous malformation) of colon; Diabetes ([address] Hypertension; Liver disease; Macular degeneration; Other ill-defined conditions; Portal hypertension ([address] Psychiatric disorder; PUD (peptic ulcer disease); Thrombocytopenia ([address] and Varices, esophageal ([address]   Mr. Jerman Osorio  has a past surgical history that includes orthopaedic (1999) and other surgical.  Social History/Living Environment:   Home Environment: Private residence  One/Two Story Residence: One story  Living Alone: No  Support Systems: Spouse/Significant Other/Partner  Patient Expects to be Discharged to[de-identified] Unknown  Current DME Used/Available at Home: Cane, straight, Walker  Tub or Shower Type: Shower  Prior Level of Function/Work/Activity:  Wife not present to assist with baseline data; it is assumed that he requires assistance at baseline with ADLs  Personal Factors:          Sex:  male        Age:  80 y.o. Number of Personal Factors/Comorbidities that affect the Plan of Care: Expanded review of therapy/medical records (1-2):  MODERATE COMPLEXITY   ASSESSMENT OF OCCUPATIONAL PERFORMANCE[de-identified]   Activities of Daily Living:           Basic ADLs (From Assessment) Complex ADLs (From Assessment)   Basic ADL  Feeding: Minimum assistance  Oral Facial Hygiene/Grooming: Moderate assistance  Bathing: Maximum assistance  Upper Body Dressing: Minimum assistance  Lower Body Dressing: Total assistance  Toileting: Total assistance Instrumental ADL  Meal Preparation: Total assistance  Homemaking: Total assistance  Medication Management: Total assistance  Financial Management: Total assistance   Grooming/Bathing/Dressing Activities of Daily Living   Grooming  Washing Hands: Supervision/set-up Cognitive Retraining  Safety/Judgement: Fall prevention                                  Most Recent Physical Functioning:   Gross Assessment:                  Posture:  Posture (WDL): Exceptions to WDL  Posture Assessment:  Forward head  Balance:    Bed Mobility:     Wheelchair Mobility:     Transfers:                       Patient Vitals for the past 6 hrs:   BP BP Patient Position SpO2 Pulse   08/20/17 0912 99/53 At rest 92 % 82        Mental Status  Neurologic State: Alert  Orientation Level: Oriented to person  Cognition: Follows commands, Poor safety awareness, Recognition of people/places  Perception: Appears intact  Perseveration: Perseverates during conversation  Safety/Judgement: Fall prevention Physical Skills Involved:  1. Range of Motion  2. Balance  3. Strength  4. Activity Tolerance  5. Gross Motor Control  6. Vision  7. Pain (acute) Cognitive Skills Affected (resulting in the inability to perform in a timely and safe manner):  1. Perception  2. Executive Function  3. Immediate Memory  4. Short Term Recall  5. Long Term Memory  6. Sustained Attention  7. Divided Attention  8. Comprehension  9. Expression Psychosocial Skills Affected:  1. Habits/Routines   Number of elements that affect the Plan of Care: 5+:  HIGH COMPLEXITY   CLINICAL DECISION MAKIN[address] [place] [address] AM-PAC 6 Clicks   Daily Activity Inpatient Short Form  How much help from another person does the patient currently need. .. Total A Lot A Little None   1. Putting on and taking off regular lower body clothing? [X] 1   [ ] 2   [ ] 3   [ ] 4   2. Bathing (including washing, rinsing, drying)? [ ] 1   [X] 2   [ ] 3   [ ] 4   3. Toileting, which includes using toilet, bedpan or urinal?   [X] 1   [ ] 2   [ ] 3   [ ] 4   4. Putting on and taking off regular upper body clothing?   [ ] 1   [ ] 2   [X] 3   [ ] 4   5. Taking care of personal grooming such as brushing teeth? [ ] 1   [ ] 2   [X] 3   [ ] 4   6. Eating meals? [ ] 1   [ ] 2   [X] 3   [ ] 4   © , Trustees of [address] [place] [address], under license to [employer]. All rights reserved    Score:  Initial: 13 Most Recent: X (Date: -- )     Interpretation of Tool:  Represents activities that are increasingly more difficult (i.e. Bed mobility, Transfers, Gait).        Score 24 23 22-20 19-15 14-10 9-7 6       Modifier CH CI CJ CK CL CM CN         · Self Care:               - CURRENT STATUS:    CL - 60%-79% impaired, limited or restricted               - GOAL STATUS:           CK - 40%-59% impaired, limited or restricted               - D/C STATUS:                       ---------------To be determined---------------  [place] / Product Type: Medicare /       Medical Necessity:     · Patient is expected to demonstrate progress in strength, balance, coordination and functional technique to decrease assistance required with ADLs. Reason for Services/Other Comments:  · Patient continues to require skilled intervention due to patient unable to attend/participate in therapy as expected. Use of outcome tool(s) and clinical judgement create a POC that gives a: MODERATE COMPLEXITY             TREATMENT:   (In addition to Assessment/Re-Assessment sessions the following treatments were rendered)      Pre-treatment Symptoms/Complaints:    Pain: Initial:   Pain Intensity 1: 7  Pain Location 1: Hip  Post Session:  Same, RN aware      Self Care: (10): Procedure(s) (per grid) utilized to improve and/or restore self-care/home management as related to grooming. Required min verbal cueing to facilitate activities of daily living skills. Braces/Orthotics/Lines/Etc:   · IV  Treatment/Session Assessment:    · Response to Treatment:  No progress. · Interdisciplinary Collaboration:  · Physical Therapy Assistant, Certified Occupational Therapy Assistant and Registered Nurse  · After treatment position/precautions:  · Up in chair, Bed/Chair-wheels locked, Call light within reach, RN notified and Family at bedside  · Compliance with Program/Exercises: Will assess as treatment progresses. · Recommendations/Intent for next treatment session: \"Next visit will focus on advancements to more challenging activities and reduction in assistance provided\".   Total Treatment Duration:OT Patient Time In/Time Out  Time In: 7095  Time Out: 550 [facility]

## 2017-08-20 NOTE — PROGRESS NOTES
TRANSFER - OUT REPORT:    Verbal report given to [employer] (name) on Fartun Winston  being transferred to [facility] unit 2 (unit) for routine progression of care       Report consisted of patients Situation, Background, Assessment and   Recommendations(SBAR). Information from the following report(s) SBAR, Kardex, Intake/Output, MAR and Recent Results was reviewed with the receiving nurse. Lines:       Opportunity for questions and clarification was provided.       Patient transported with:   [facility]

## 2017-08-20 NOTE — DISCHARGE SUMMARY
Hospitalist Discharge Summary     Patient ID:  Kristin Zelaya  [id number]  48 y.o.  7/15/1933  Admit date: 8/14/2017  3:16 PM  Discharge date and time: 8/20/2017  Attending: Kofi Milton DO  PCP:  Kadeem Greco MD  Treatment Team: Attending Provider: Kofi Milton DO; Utilization Review: Lisa Stallings; Care Manager: Yolande Brannon LMSW    Principal Diagnosis:   Right nondisplaced intertrochanteric femoral fracture s/p ORIF  Post op acute blood loss anemia s/p 2 units of PRBC transfusion    Active Problems:    Fracture closed, femur, shaft ([address] (8/14/2017)      Fracture (8/14/2017)             Hospital Course:  Please refer to the admission H&P for details of presentation. In summary, the patient is   80years old male with pmhx of parkinson's, liver cirrhosis (compensated), chronic thrombocytopenia, HTN, DM-2, depression, physical deconditioning who was admitted on 8/14 as s/p fall and endorsing right hip pain which was found to be secondary to closed nondisplaced intertrochanteric femoral fracture. Pt was seen by orthopedics, and taken for surgery on 8/15. Post op course was uneventful except for post op acute blood loss anemia. Pt Hb dropped from 9.2 to 7.4, for which he received 2 units of PRBC transfusion, which corrected the Hb to 8.9. Pt was also complaining of pain in right shoulder, for which plain x-ray was done which showed no acute fracture or dislocation, and pain was managed conservatively with pain meds. Wound care was consulted in view of non blanching erythema on right and left heel, they recommended rooke boots and frequent position change to prevent progression of pressure sore. Pt also had mildly displaced proximal fracture of right clavicle, which was managed conservatively by orthopedics. Pt was evaluated by PT/OT, was recommended to for short term rehab at discharge. CM assisted for placement to [facility]. Urine was tested for infection, and was negative for UTI.   Pt was resumed on home medications. He has been hemodynamically stable with no evidence of acute bleeding or decompensation at this time, and is medically cleared for discharge to rehab today. Rest of the hospital course was uneventful. Significant Diagnostic Studies:   THREE-VIEW RIGHT SHOULDER:     CLINICAL HISTORY:  Moderate right shoulder pain and bruising since recent fall.     COMPARISON:  August 14, 2017. IMPRESSION:  Stable postoperative appearance of the proximal right humerus with  degenerative changes suggesting possible rotator cuff impingement but no acute  bony abnormality identified. Functional SA: Findings: The patient's ability to swallow was evaluated with thin, nectar,  honey, pudding, and mixed consistencies. The patient had aspiration with thin  consistency from a cup and penetration with nectar consistency from a cup. No  penetration or aspiration was seen with honey, pudding, or mixed consistencies. Please see speech pathologist report for further details.     Fluoroscopy time: 2.3 minutes. Right femur     8/14/2017     History:  Right hip and leg pain, patient fell 5 days earlier.     Findings:  AP and lateral views submitted and demonstrate oblique nondisplaced  intertrochanteric fracture. No additional fractures right femur. Normal  alignment at the hip and knee.     IMPRESSION  Impression:  Nondisplaced intertrochanteric fracture. Ct CERVICAL SPINE:  IMPRESSION:     1. No acute fracture in the cervical spine.     2. Mildly displaced proximal right clavicular fracture.     2. Nonspecific asymmetric fat stranding in the posterior triangle of the neck on  the right. Chest X-ray     INDICATION:   Recent fall, right-sided chest pain     A portable AP view of the chest was obtained.     FINDINGS: There is minimal scarring/atelectasis in the left lung base. The  right lung is clear. There is no pneumothorax or effusion.   The heart size is  normal.  The bony thorax is intact.       IMPRESSION  IMPRESSION: Minimal scarring/atelectasis in the left lung base  Labs: Results:       Chemistry Recent Labs      08/19/17   0540  08/18/17   0629   GLU  138*  154*   NA  143  141   K  4.0  4.3   CL  110*  107   CO2  25  28   BUN  24*  26*   CREA  0.93  0.98   CA  9.1  9.2   AGAP  8  6*      CBC w/Diff Recent Labs      08/19/17   0852  08/18/17   0629   WBC  2.2*  2.3*   RBC  3.02*  2.59*   HGB  8.9*  7.7*   HCT  26.1*  22.2*   PLT  54*  41*   GRANS   --   67   LYMPH   --   16   EOS   --   7      Cardiac Enzymes No results for input(s): CPK, CKND1, ALLEGRA in the last 72 hours. No lab exists for component: CKRMB, TROIP   Coagulation No results for input(s): PTP, INR, APTT in the last 72 hours. No lab exists for component: INREXT    Lipid Panel No results found for: CHOL, CHOLPOCT, CHOLX, CHLST, CHOLV, 758799, HDL, LDL, LDLC, DLDLP, 745183, VLDLC, VLDL, TGLX, TRIGL, TRIGP, TGLPOCT, CHHD, CHHDX   BNP No results for input(s): BNPP in the last 72 hours. Liver Enzymes No results for input(s): TP, ALB, TBIL, AP, SGOT, GPT in the last 72 hours. No lab exists for component: DBIL   Thyroid Studies No results found for: T4, T3U, TSH, TSHEXT         Discharge Exam:  Visit Vitals    /60 (BP 1 Location: Left arm, BP Patient Position: At rest)    Pulse 74    Temp 98 °F (36.7 °C)    Resp 16    SpO2 93%     General:                    No acute distress, elderly, frail    Lungs:                                 CTA Bilaterally. Heart:                                  Regular rate and rhythm,  No murmur, rub, or gallop  Abdomen:                  Soft, Non distended, Non tender, Positive bowel sounds  Extremities:               No cyanosis, clubbing or edema, surgical dressing on right hip, bruise over right shoulder.  Blanchable redness on left hel, with nonblanchable  2x2cm reddish purple area on right heel  Neurologic:                gcs 15, no motor or sensory deficits, CN 2-12 intact  Psych:             AO x 1, mood and affect anxious    Disposition: STR  Discharge Condition: stable  Patient Instructions:   Current Discharge Medication List      START taking these medications    Details   traMADol (ULTRAM) 50 mg tablet Take 1 Tab by mouth every six (6) hours as needed. Max Daily Amount: 200 mg. Qty: 40 Tab, Refills: 1      rifAXIMin (XIFAXAN) 550 mg tablet Take 1 Tab by mouth two (2) times a day for 30 days. Qty: 60 Tab, Refills: 1      calcium-vitamin D (OYSTER SHELL) 500 mg(1,250mg) -200 unit per tablet Take 1 Tab by mouth three (3) times daily (with meals) for 30 days. Qty: 90 Tab, Refills: 2      docusate sodium (COLACE) 100 mg capsule Take 1 Cap by mouth two (2) times a day for 90 days. Qty: 60 Cap, Refills: 2      enoxaparin (LOVENOX) 30 mg/0.3 mL injection 0.3 mL by SubCUTAneous route every twenty-four (24) hours for 28 days. Qty: 8.4 mL, Refills: 0      HYDROcodone-acetaminophen (NORCO) 5-325 mg per tablet Take 1 Tab by mouth every four (4) hours as needed for Pain. Max Daily Amount: 6 Tabs. Qty: 20 Tab, Refills: 0         CONTINUE these medications which have CHANGED    Details   ALPRAZolam (XANAX) 2 mg tablet Take 1 Tab by mouth two (2) times a day. Max Daily Amount: 4 mg. Qty: 20 Tab, Refills: 0         CONTINUE these medications which have NOT CHANGED    Details   ferrous sulfate 325 mg (65 mg iron) cpER Take  by mouth. lactulose (CHRONULAC) 10 gram/15 mL solution Take 10 g by mouth daily. glipiZIDE (GLUCOTROL) 5 mg tablet Take 5 mg by mouth Daily (before breakfast). cyanocobalamin (VITAMIN B-12) 1,000 mcg tablet Take 1,000 mcg by mouth daily. Calcium-Cholecalciferol, D3, (CALCIUM 600 WITH VITAMIN D3) 600 mg(1,500mg) -400 unit cap Take 1 Cap by mouth daily. Indications: HYPOCALCEMIA      OTHER,NON-FORMULARY, Preser-vision takes BID      allopurinol (ZYLOPRIM) 100 mg tablet Take 100 mg by mouth daily.         multivitamin (ONE A DAY) tablet Take 1 Tab by mouth daily. metFORMIN (GLUCOPHAGE) 500 mg tablet Take 500 mg by mouth two (2) times daily (with meals). thiamine (B-1) 100 mg tablet Take 100 mg by mouth daily. omeprazole (PRILOSEC) 40 mg capsule Take 40 mg by mouth daily. STOP taking these medications       levofloxacin (LEVAQUIN) 750 mg tablet Comments:   Reason for Stopping:               Activity: PT/OT Eval and Treat  Diet: Diabetic Diet  Wound Care: As directed    Follow-up  ·   Follow up with Orthopedics as scheduled  · Follow up with PCP in 2-3 weeks.   ·   Time spent to discharge patient 35 minutes  Signed:  Yolette Strong MD  8/20/2017  7:52 AM

## 2017-08-20 NOTE — PROGRESS NOTES
Problem: Mobility Impaired (Adult and Pediatric)  Goal: *Acute Goals and Plan of Care (Insert Text)  STG:  (1.)Mr. Casey Erazo will perform bed mobility with MODERATE ASSIST within 3 day(s). (2.)Mr. Casey Erazo will transfer from bed to chair and chair to bed with MODERATE ASSIST using the least restrictive device within 3 day(s). (3.)Mr. Casey Erazo will ambulate with MODERATE ASSIST for 10+ feet with the least restrictive device within 3 day(s). (4.)Mr. Casey Erazo will tolerate 15+ minutes of therapeutic activity/exercise while maintaining stable vitals to improve functional strength and endurance within 3 day(s). LTG:  (1.)Mr. Casey Erazo will perform bed mobility with MINIMAL ASSIST within 7 day(s). (2.)Mr. Casey Erazo will transfer from bed to chair and chair to bed with MINIMAL ASSIST using the least restrictive device within 7 day(s). (3.)Mr. Casey Erazo will ambulate with MINIMAL ASSIST for 25+ feet with the least restrictive device within 7 day(s). (4.)Mr. Casey Erazo will tolerate 25+ minutes of therapeutic activity/exercise while maintaining stable vitals to improve functional strength and endurance within 7 day(s).   ________________________________________________________________________________________________      PHYSICAL THERAPY: Daily Note, Treatment Day: 4th and AM    8/20/2017  INPATIENT: Hospital Day: 7  [place] / Product Type: Medicare /       Judi POLK     NAME/AGE/GENDER: Christopher Llamas is a 80 y.o. male        PRIMARY DIAGNOSIS: Intertrochanteric fracture of right femur, closed, initial encounter ([facility] [address] [facility] Leisa Villegas <principal problem not specified> <principal problem not specified>  Procedure(s) (LRB):  RIGHT FEMUR INSERTION INTRA MEDULLARY NAIL (Right)  5 Days Post-Op  ICD-10: Treatment Diagnosis:       · Generalized Muscle Weakness (M62.81)  · Difficulty in walking, Not elsewhere classified (R26.2)  · History of falling (Z91.81)   Precaution/Allergies:  Review of patient's allergies indicates no known allergies. ASSESSMENT:      Mr. Salomon Ragland is a 80year old male s/p right femoral IM nailing and is WBAT R LE. Patient lives with his spouse in a single story residence with steps to enter. At baseline, patient has a history significant for Parkinson's Disease as well as being legally blind and hard of hearing per chart. Mr. Salomon Ragland endorses frequent falls at home, requires assistance with ADLs from spouse, and ambulates household distances with a SPC. Pt was supine and agreeable to therapy. Pt performed supine to sit with max assist.  Pt with posterior lean initially in sitting however it did improve quickly once edge of bed. Pt able to maintain sitting balance with SBA. Pt stood x 3 to rolling walker with max assist x 2. Pt with heavy posterior lean this morning with standing. Pt stated \"I don't want to\" a couple of times. Tried to encourage and explain benefits of being out of bed, however pt wanting to lay back down. Pt then transferred to chair with max assist x 2. Pt had down so much better last Thursday when he was wanting to get up out of bed than today when he wasn't wanting to. He was actually resisting assistance. Pt was left up with family present. Pt with improved sitting balance this morning, however no progress with transfers due to lack of desire to get up. Will continue with POC. This section established at most recent assessment   PROBLEM LIST (Impairments causing functional limitations):  1. Decreased Strength  2. Decreased ADL/Functional Activities  3. Decreased Transfer Abilities  4. Decreased Ambulation Ability/Technique  5. Decreased Balance  6. Increased Pain  7. Decreased Activity Tolerance  8. Increased Shortness of Breath  9. Decreased Flexibility/Joint Mobility  10. Decreased Knowledge of Precautions  11. Decreased [place] with Home Exercise Program  12.  Decreased Cognition    INTERVENTIONS PLANNED: (Benefits and precautions of physical therapy have been discussed with the patient.)  1. Balance Exercise  2. Bed Mobility  3. Family Education  4. Gait Training  5. Home Exercise Program (HEP)  6. Range of Motion (ROM)  7. Therapeutic Activites  8. Ultrasound (US)  9. Group Therapy      TREATMENT PLAN: Frequency/Duration: twice daily for duration of hospital stay  Rehabilitation Potential For Stated Goals: Annabelle Ella REHABILITATION/EQUIPMENT: (at time of discharge pending progress): Continue Skilled Therapy. HISTORY:   History of Present Injury/Illness (Reason for Referral):  S/p right femoral IM nailing  Past Medical History/Comorbidities:   Mr. Jaren Clifford  has a past medical history of Alcohol abuse; AVM (arteriovenous malformation) of colon; Diabetes ([address] Hypertension; Liver disease; Macular degeneration; Other ill-defined conditions; Portal hypertension ([address] Psychiatric disorder; PUD (peptic ulcer disease); Thrombocytopenia ([address] and Varices, esophageal ([address] Mr. Jaren Clifford  has a past surgical history that includes orthopaedic (1999) and other surgical.  Social History/Living Environment:   Home Environment: Private residence  One/Two Story Residence: One story  Living Alone: No  Support Systems: Spouse/Significant Other/Partner  Patient Expects to be Discharged to[de-identified] Unknown  Current DME Used/Available at Home: Cane, straight, Walker  Tub or Shower Type: Shower  Prior Level of Function/Work/Activity:   Patient lives with his spouse in a single story residence with steps to enter. At baseline, patient has a history significant for Parkinson's Disease with frequent falls, requiring assistance with ADLs from spouse, and ambulates household distances with a SPC.    Number of Personal Factors/Comorbidities that affect the Plan of Care: 1-2: MODERATE COMPLEXITY   EXAMINATION:   Most Recent Physical Functioning:   Gross Assessment:                  Posture:     Balance:  Sitting - Static: Fair (occasional)  Sitting - Dynamic: Fair (occasional)  Standing - Static: Poor  Standing - Dynamic : Poor Bed Mobility:  Supine to Sit: Maximum assistance  Scooting: Maximum assistance  Wheelchair Mobility:     Transfers:  Sit to Stand: Maximum assistance;Assist x2  Stand to Sit: Maximum assistance;Assist x2  Stand Pivot Transfers: Maximum assistance (x 2)  Gait:             Body Structures Involved:  1. Bones  2. Joints  3. Muscles  4. Ligaments Body Functions Affected:  1. Neuromusculoskeletal  2. Movement Related Activities and Participation Affected:  1. Mobility  2. Self Care  3. Domestic Life   Number of elements that affect the Plan of Care: 4+: HIGH COMPLEXITY   CLINICAL PRESENTATION:   Presentation: Evolving clinical presentation with changing clinical characteristics: MODERATE COMPLEXITY   CLINICAL DECISION MAKIN [facility] Short Form  How much difficulty does the patient currently have. .. Unable A Lot A Little None   1. Turning over in bed (including adjusting bedclothes, sheets and blankets)? [ ] 1   [X] 2   [ ] 3   [ ] 4   2. Sitting down on and standing up from a chair with arms ( e.g., wheelchair, bedside commode, etc.)   [ ] 1   [X] 2   [ ] 3   [ ] 4   3. Moving from lying on back to sitting on the side of the bed? [ ] 1   [X] 2   [ ] 3   [ ] 4   How much help from another person does the patient currently need. .. Total A Lot A Little None   4. Moving to and from a bed to a chair (including a wheelchair)? [X] 1   [ ] 2   [ ] 3   [ ] 4   5. Need to walk in hospital room? [X] 1   [ ] 2   [ ] 3   [ ] 4   6. Climbing 3-5 steps with a railing? [X] 1   [ ] 2   [ ] 3   [ ] 4   © , Trustees of [address] [place] [address] [address] [address], under license to [employer]. All rights reserved    Score:  Initial: 9 Most Recent: 9 (Date: 17 )     Interpretation of Tool:  Represents activities that are increasingly more difficult (i.e. Bed mobility, Transfers, Gait).        Score 24 23 22-20 19-15 14-10 9-7 6       Modifier CH CI CJ CK CL CM CN         · Mobility - Walking and Moving Around:               - CURRENT STATUS:    CM - 80%-99% impaired, limited or restricted               - GOAL STATUS:           CL - 60%-79% impaired, limited or restricted               - D/C STATUS:                       ---------------To be determined---------------  [place] / [place] / Product Type: Medicare /       Medical Necessity:     · Patient demonstrates fair rehab potential due to higher previous functional level. Reason for Services/Other Comments:  · Patient continues to require skilled intervention due to decreased functional mobility and balance/gait status from baseline. .   Use of outcome tool(s) and clinical judgement create a POC that gives a: Questionable prediction of patient's progress: MODERATE COMPLEXITY                 TREATMENT:      Pre-treatment Symptoms/Complaints:    Pain: Initial: Unable     Post Session:       Therapeutic Activity: (    25 Minutes): Therapeutic activities including bed mobility, transfer training, balance training, safety awareness training, and patient education to improve mobility, strength, balance and coordination. Required maximal   to promote static and dynamic balance in standing and promote coordination of bilateral, lower extremity(s). Therapeutic Exercise: (  0 minutes):  Exercises per grid below to improve mobility, strength and coordination. Required minimal visual, verbal and manual cues to promote proper body alignment and promote proper body posture. Progressed range and repetitions as indicated.      Date:  8/17/17 Date:  8/19/17 Date:     ACTIVITY/EXERCISE AM PM AM PM AM PM   Supine ankle pumps X 10 B, AA R   X 10 R AA     Supine heel slides X 10 B, AA R   X 15 R AA     Supine hip abd X 10 B, AA R   X 15 R AA     Supine SAQ X 10 B, AA R   X 10 R AA                                B = bilateral; AA = active assistive; A = active; P = passive          Braces/Orthotics/Lines/Etc:   · room air  Treatment/Session Assessment:    · Response to Treatment:  See above. · Interdisciplinary Collaboration:  · Physical Therapy Assistant and Registered Nurse  · After treatment position/precautions:  · Up in chair, Bed alarm/tab alert on, Bed/Chair-wheels locked, Call light within reach, RN notified and Family at bedside  · Compliance with Program/Exercises: Will assess as treatment progresses. · Recommendations/Intent for next treatment session: \"Next visit will focus on advancements to more challenging activities and reduction in assistance provided\".      Total Treatment Duration:PT Patient Time In/Time Out  Time In: 4807  Time Out: [address], [facility]

## 2017-08-20 NOTE — PROGRESS NOTES
Problem: Falls - Risk of  Goal: *Absence of Falls  Document Matheus Fall Risk and appropriate interventions in the flowsheet.    Outcome: Progressing Towards Goal  Fall Risk Interventions:  Mobility Interventions: Patient to call before getting OOB, Bed/chair exit alarm     Mentation Interventions: Bed/chair exit alarm, Door open when patient unattended     Medication Interventions: Bed/chair exit alarm, Patient to call before getting OOB     Elimination Interventions: Bed/chair exit alarm, Call light in reach, Patient to call for help with toileting needs     History of Falls Interventions: Bed/chair exit alarm

## 2017-08-22 ENCOUNTER — HOSPITAL ENCOUNTER (OUTPATIENT)
Dept: LAB | Age: 82
Discharge: HOME | End: 2017-08-22

## 2017-08-22 LAB — AMMONIA PLAS-SCNC: 22 UMOL/L (ref 11–32)

## 2017-08-22 PROCEDURE — 82140 ASSAY OF AMMONIA: CPT | Performed by: UROLOGY

## 2017-09-18 ENCOUNTER — HOSPITAL ENCOUNTER (OUTPATIENT)
Dept: LAB | Age: 82
Discharge: HOME | End: 2017-09-18
Payer: MEDICARE

## 2017-09-18 DIAGNOSIS — D69.6 THROMBOCYTOPENIA (HCC): ICD-10-CM

## 2017-09-18 DIAGNOSIS — D50.9 IRON DEFICIENCY ANEMIA, UNSPECIFIED IRON DEFICIENCY ANEMIA TYPE: ICD-10-CM

## 2017-09-18 DIAGNOSIS — E55.9 VITAMIN D DEFICIENCY: ICD-10-CM

## 2017-09-18 PROBLEM — D61.818 PANCYTOPENIA (HCC): Status: ACTIVE | Noted: 2017-09-18

## 2017-09-18 LAB
ALBUMIN SERPL-MCNC: 3 G/DL (ref 3.2–4.6)
ALBUMIN/GLOB SERPL: 0.9 {RATIO} (ref 1.2–3.5)
ALP SERPL-CCNC: 142 U/L (ref 50–136)
ALT SERPL-CCNC: 23 U/L (ref 12–65)
ANION GAP SERPL CALC-SCNC: 7 MMOL/L (ref 7–16)
AST SERPL-CCNC: 27 U/L (ref 15–37)
BASOPHILS # BLD: 0 K/UL (ref 0–0.2)
BASOPHILS NFR BLD: 0 % (ref 0–2)
BILIRUB SERPL-MCNC: 0.6 MG/DL (ref 0.2–1.1)
BUN SERPL-MCNC: 21 MG/DL (ref 8–23)
CALCIUM SERPL-MCNC: 9.2 MG/DL (ref 8.3–10.4)
CHLORIDE SERPL-SCNC: 106 MMOL/L (ref 98–107)
CO2 SERPL-SCNC: 27 MMOL/L (ref 21–32)
CREAT SERPL-MCNC: 1.47 MG/DL (ref 0.8–1.5)
DIFFERENTIAL METHOD BLD: ABNORMAL
EOSINOPHIL # BLD: 0 K/UL (ref 0–0.8)
EOSINOPHIL NFR BLD: 2 % (ref 0.5–7.8)
ERYTHROCYTE [DISTWIDTH] IN BLOOD BY AUTOMATED COUNT: 15.8 % (ref 11.9–14.6)
FERRITIN SERPL-MCNC: 96 NG/ML (ref 8–388)
FOLATE SERPL-MCNC: 22.9 NG/ML (ref 3.1–17.5)
GLOBULIN SER CALC-MCNC: 3.5 G/DL (ref 2.3–3.5)
GLUCOSE SERPL-MCNC: 188 MG/DL (ref 65–100)
HCT VFR BLD AUTO: 26.8 % (ref 41.1–50.3)
HGB BLD-MCNC: 8.8 G/DL (ref 13.6–17.2)
HGB RETIC QN AUTO: 35 PG (ref 29–35)
IMM RETICS NFR: 4 % (ref 2.3–13.4)
IRON SATN MFR SERPL: 18 %
IRON SERPL-MCNC: 48 UG/DL (ref 35–150)
LYMPHOCYTES # BLD: 0.2 K/UL (ref 0.5–4.6)
LYMPHOCYTES NFR BLD: 10 % (ref 13–44)
MCH RBC QN AUTO: 30.9 PG (ref 26.1–32.9)
MCHC RBC AUTO-ENTMCNC: 32.8 G/DL (ref 31.4–35)
MCV RBC AUTO: 94 FL (ref 79.6–97.8)
MONOCYTES # BLD: 0.2 K/UL (ref 0.1–1.3)
MONOCYTES NFR BLD: 7 % (ref 4–12)
NEUTS SEG # BLD: 1.9 K/UL (ref 1.7–8.2)
NEUTS SEG NFR BLD: 81 % (ref 43–78)
NRBC # BLD: 0 K/UL (ref 0–0.2)
PERIPHERAL SMEAR,PSM: NORMAL
PLATELET # BLD AUTO: 55 K/UL (ref 150–450)
PMV BLD AUTO: 11 FL (ref 10.8–14.1)
POTASSIUM SERPL-SCNC: 4.9 MMOL/L (ref 3.5–5.1)
PROT SERPL-MCNC: 6.5 G/DL (ref 6.3–8.2)
RBC # BLD AUTO: 2.85 M/UL (ref 4.23–5.67)
RETICS # AUTO: 0.07 M/UL (ref 0.03–0.1)
RETICS/RBC NFR AUTO: 2.3 % (ref 0.3–2)
SODIUM SERPL-SCNC: 140 MMOL/L (ref 136–145)
TIBC SERPL-MCNC: 273 UG/DL (ref 250–450)
VIT B12 SERPL-MCNC: 1278 PG/ML (ref 193–986)
WBC # BLD AUTO: 2.3 K/UL (ref 4.3–11.1)

## 2017-09-18 PROCEDURE — 36415 COLL VENOUS BLD VENIPUNCTURE: CPT | Performed by: INTERNAL MEDICINE

## 2017-09-18 PROCEDURE — 82746 ASSAY OF FOLIC ACID SERUM: CPT | Performed by: INTERNAL MEDICINE

## 2017-09-18 PROCEDURE — 85025 COMPLETE CBC W/AUTO DIFF WBC: CPT | Performed by: INTERNAL MEDICINE

## 2017-09-18 PROCEDURE — 82607 VITAMIN B-12: CPT | Performed by: INTERNAL MEDICINE

## 2017-09-18 PROCEDURE — 82728 ASSAY OF FERRITIN: CPT | Performed by: INTERNAL MEDICINE

## 2017-09-18 PROCEDURE — 80053 COMPREHEN METABOLIC PANEL: CPT | Performed by: INTERNAL MEDICINE

## 2017-09-18 PROCEDURE — 85046 RETICYTE/HGB CONCENTRATE: CPT | Performed by: INTERNAL MEDICINE

## 2017-09-18 PROCEDURE — 82652 VIT D 1 25-DIHYDROXY: CPT | Performed by: INTERNAL MEDICINE

## 2017-09-18 PROCEDURE — 83540 ASSAY OF IRON: CPT | Performed by: INTERNAL MEDICINE

## 2017-09-19 LAB — 1,25(OH)2D3 SERPL-MCNC: 24.2 PG/ML (ref 19.9–79.3)

## 2017-09-27 ENCOUNTER — HOSPITAL ENCOUNTER (EMERGENCY)
Age: 82
Discharge: NURSING, REHAB OR LONG-TERM CARE | End: 2017-09-27
Attending: EMERGENCY MEDICINE
Payer: MEDICARE

## 2017-09-27 ENCOUNTER — APPOINTMENT (OUTPATIENT)
Dept: GENERAL RADIOLOGY | Age: 82
End: 2017-09-27
Attending: EMERGENCY MEDICINE
Payer: MEDICARE

## 2017-09-27 VITALS
OXYGEN SATURATION: 100 % | WEIGHT: 160 LBS | SYSTOLIC BLOOD PRESSURE: 139 MMHG | BODY MASS INDEX: 25.11 KG/M2 | DIASTOLIC BLOOD PRESSURE: 67 MMHG | HEIGHT: 67 IN | HEART RATE: 79 BPM | TEMPERATURE: 98.1 F | RESPIRATION RATE: 16 BRPM

## 2017-09-27 DIAGNOSIS — D61.818 PANCYTOPENIA (HCC): ICD-10-CM

## 2017-09-27 DIAGNOSIS — N39.0 URINARY TRACT INFECTION WITHOUT HEMATURIA, SITE UNSPECIFIED: Primary | ICD-10-CM

## 2017-09-27 LAB
ALBUMIN SERPL-MCNC: 3.1 G/DL (ref 3.2–4.6)
ALBUMIN/GLOB SERPL: 0.9 {RATIO} (ref 1.2–3.5)
ALP SERPL-CCNC: 116 U/L (ref 50–136)
ALT SERPL-CCNC: 23 U/L (ref 12–65)
ANION GAP SERPL CALC-SCNC: 5 MMOL/L (ref 7–16)
AST SERPL-CCNC: 39 U/L (ref 15–37)
BACTERIA URNS QL MICRO: ABNORMAL /HPF
BASOPHILS # BLD: 0 K/UL (ref 0–0.2)
BASOPHILS NFR BLD: 0 % (ref 0–2)
BILIRUB SERPL-MCNC: 0.9 MG/DL (ref 0.2–1.1)
BUN SERPL-MCNC: 22 MG/DL (ref 8–23)
CALCIUM SERPL-MCNC: 9 MG/DL (ref 8.3–10.4)
CASTS URNS QL MICRO: ABNORMAL /LPF
CHLORIDE SERPL-SCNC: 105 MMOL/L (ref 98–107)
CO2 SERPL-SCNC: 26 MMOL/L (ref 21–32)
CREAT SERPL-MCNC: 1.08 MG/DL (ref 0.8–1.5)
DIFFERENTIAL METHOD BLD: ABNORMAL
EOSINOPHIL # BLD: 0.1 K/UL (ref 0–0.8)
EOSINOPHIL NFR BLD: 3 % (ref 0.5–7.8)
EPI CELLS #/AREA URNS HPF: 0 /HPF
ERYTHROCYTE [DISTWIDTH] IN BLOOD BY AUTOMATED COUNT: 14.9 % (ref 11.9–14.6)
GLOBULIN SER CALC-MCNC: 3.6 G/DL (ref 2.3–3.5)
GLUCOSE SERPL-MCNC: 165 MG/DL (ref 65–100)
HCT VFR BLD AUTO: 29.5 % (ref 41.1–50.3)
HGB BLD-MCNC: 9.2 G/DL (ref 13.6–17.2)
IMM GRANULOCYTES # BLD: 0 K/UL (ref 0–0.5)
IMM GRANULOCYTES NFR BLD: 0.4 % (ref 0–5)
LACTATE BLD-SCNC: 1.4 MMOL/L (ref 0.5–1.9)
LACTATE BLD-SCNC: 2.8 MMOL/L (ref 0.5–1.9)
LYMPHOCYTES # BLD: 0.3 K/UL (ref 0.5–4.6)
LYMPHOCYTES NFR BLD: 12 % (ref 13–44)
MCH RBC QN AUTO: 29.7 PG (ref 26.1–32.9)
MCHC RBC AUTO-ENTMCNC: 31.2 G/DL (ref 31.4–35)
MCV RBC AUTO: 95.2 FL (ref 79.6–97.8)
MONOCYTES # BLD: 0.2 K/UL (ref 0.1–1.3)
MONOCYTES NFR BLD: 9 % (ref 4–12)
NEUTS SEG # BLD: 1.7 K/UL (ref 1.7–8.2)
NEUTS SEG NFR BLD: 76 % (ref 43–78)
PLATELET # BLD AUTO: 58 K/UL (ref 150–450)
PMV BLD AUTO: 10.7 FL (ref 10.8–14.1)
POTASSIUM SERPL-SCNC: 4.7 MMOL/L (ref 3.5–5.1)
PROT SERPL-MCNC: 6.7 G/DL (ref 6.3–8.2)
RBC # BLD AUTO: 3.1 M/UL (ref 4.23–5.67)
RBC #/AREA URNS HPF: ABNORMAL /HPF
SODIUM SERPL-SCNC: 136 MMOL/L (ref 136–145)
TROPONIN I BLD-MCNC: 0.01 NG/ML (ref 0.02–0.05)
WBC # BLD AUTO: 2.2 K/UL (ref 4.3–11.1)
WBC URNS QL MICRO: >100 /HPF

## 2017-09-27 PROCEDURE — 87040 BLOOD CULTURE FOR BACTERIA: CPT | Performed by: EMERGENCY MEDICINE

## 2017-09-27 PROCEDURE — 85025 COMPLETE CBC W/AUTO DIFF WBC: CPT | Performed by: EMERGENCY MEDICINE

## 2017-09-27 PROCEDURE — 74011250636 HC RX REV CODE- 250/636: Performed by: EMERGENCY MEDICINE

## 2017-09-27 PROCEDURE — 84484 ASSAY OF TROPONIN QUANT: CPT

## 2017-09-27 PROCEDURE — 81015 MICROSCOPIC EXAM OF URINE: CPT | Performed by: EMERGENCY MEDICINE

## 2017-09-27 PROCEDURE — 83605 ASSAY OF LACTIC ACID: CPT

## 2017-09-27 PROCEDURE — 96361 HYDRATE IV INFUSION ADD-ON: CPT | Performed by: EMERGENCY MEDICINE

## 2017-09-27 PROCEDURE — 71010 XR CHEST PORT: CPT

## 2017-09-27 PROCEDURE — 99284 EMERGENCY DEPT VISIT MOD MDM: CPT | Performed by: EMERGENCY MEDICINE

## 2017-09-27 PROCEDURE — 80053 COMPREHEN METABOLIC PANEL: CPT | Performed by: EMERGENCY MEDICINE

## 2017-09-27 PROCEDURE — 74011250637 HC RX REV CODE- 250/637: Performed by: EMERGENCY MEDICINE

## 2017-09-27 PROCEDURE — 96360 HYDRATION IV INFUSION INIT: CPT | Performed by: EMERGENCY MEDICINE

## 2017-09-27 RX ORDER — SODIUM CHLORIDE 9 MG/ML
1000 INJECTION, SOLUTION INTRAVENOUS ONCE
Status: COMPLETED | OUTPATIENT
Start: 2017-09-27 | End: 2017-09-27

## 2017-09-27 RX ORDER — LEVOFLOXACIN 750 MG/1
750 TABLET ORAL DAILY
Qty: 7 TAB | Refills: 0 | Status: SHIPPED | OUTPATIENT
Start: 2017-09-27 | End: 2019-02-05

## 2017-09-27 RX ADMIN — SODIUM CHLORIDE 1000 ML: 900 INJECTION, SOLUTION INTRAVENOUS at 13:17

## 2017-09-27 RX ADMIN — SODIUM CHLORIDE 1000 ML: 900 INJECTION, SOLUTION INTRAVENOUS at 14:06

## 2017-09-27 RX ADMIN — LEVOFLOXACIN 750 MG: 500 TABLET, FILM COATED ORAL at 15:34

## 2017-09-27 NOTE — ED PROVIDER NOTES
HPI Comments: 78-year-old male with a history of pancytopenia. Currently being evaluated by hematology oncology. Sent from nursing facility with complaint of fever and low white blood cell count    Patient is awake and alert. Somewhat disoriented secondary to dementia. He voices no complaints at this time. I ran through multiple symptoms and he declined them all with the exception of some mild chills. Patient is a 80 y.o. male presenting with abnormal lab results. The history is provided by the patient, the spouse, the nursing home and the EMS personnel. Abnormal Lab Results   This is a recurrent problem. The problem occurs constantly. The problem has not changed since onset. Pertinent negatives include no chest pain, no abdominal pain and no headaches. Nothing aggravates the symptoms. Nothing relieves the symptoms. He has tried nothing for the symptoms. Past Medical History:   Diagnosis Date    Alcohol abuse     AVM (arteriovenous malformation) of colon     Diabetes ([facility]     Hypertension     Liver disease     Laennec's cirrhosis    Macular degeneration     Other ill-defined conditions     gout    Portal hypertension (HCC)     Psychiatric disorder     anxiety/depression    PUD (peptic ulcer disease)     Thrombocytopenia (HCC)     Varices, esophageal (HCC)        Past Surgical History:   Procedure Laterality Date    HX ORTHOPAEDIC  1999    repair fx right humeral neck with titanium    HX OTHER SURGICAL      sx for undescended testicle         Family History:   Problem Relation Age of Onset    Cancer Father      \"bone cancer\"       Social History     Social History    Marital status:      Spouse name: N/A    Number of children: N/A    Years of education: N/A     Occupational History    Not on file.      Social History Main Topics    Smoking status: Never Smoker    Smokeless tobacco: Never Used    Alcohol use Yes      Comment: quit  4/2010-patients wife states patient was alcoholic    Drug use: No    Sexual activity: Not on file     Other Topics Concern    Not on file     Social History Narrative         ALLERGIES: Review of patient's allergies indicates no known allergies. Review of Systems   Unable to perform ROS: Dementia   Constitutional: Positive for chills. Negative for diaphoresis and fatigue. Cardiovascular: Negative for chest pain. Gastrointestinal: Negative for abdominal pain. Neurological: Negative for headaches. Vitals:    09/27/17 1147 09/27/17 1254   BP: 110/53    Pulse: 69    Resp: 19    Temp: 98.4 °F (36.9 °C) 98.2 °F (36.8 °C)   SpO2: 99%    Weight: 72.6 kg (160 lb)    Height: 5' 7\" (1.702 m)             Physical Exam   Constitutional: He is oriented to person, place, and time. He appears well-developed and well-nourished. He appears distressed. Thin elderly male resting on the stretcher  No specific complaints voiced   HENT:   Head: Normocephalic and atraumatic. Mouth/Throat: Oropharynx is clear and moist.   Eyes: Conjunctivae and EOM are normal. Pupils are equal, round, and reactive to light. Right eye exhibits no discharge. Left eye exhibits no discharge. No scleral icterus. Neck: Normal range of motion. Neck supple. No JVD present. Cardiovascular: Normal rate, regular rhythm, normal heart sounds and intact distal pulses. Exam reveals no gallop and no friction rub. No murmur heard. Pulmonary/Chest: Effort normal and breath sounds normal. No respiratory distress. He has no wheezes. Abdominal: Soft. Normal appearance and bowel sounds are normal. He exhibits no distension. There is no hepatosplenomegaly. There is no tenderness. There is no rebound and no guarding. Musculoskeletal: Normal range of motion. He exhibits no edema or tenderness. Lymphadenopathy:     He has no cervical adenopathy. Neurological: He is alert and oriented to person, place, and time. He has normal strength. No cranial nerve deficit or sensory deficit.  He exhibits normal muscle tone. GCS eye subscore is 4. GCS verbal subscore is 5. GCS motor subscore is 6. Skin: Skin is warm and dry. No rash noted. He is not diaphoretic. No erythema. Psychiatric: His speech is normal and behavior is normal. Judgment and thought content normal. His affect is blunt. Cognition and memory are impaired. Nursing note and vitals reviewed. MDM  Number of Diagnoses or Management Options  Pancytopenia ([address]): established and worsening  Urinary tract infection without hematuria, site unspecified: new and requires workup  Diagnosis management comments: Patient's CBC reveals a persistent pancytopenia without evidence of neutropenia  Workup today does reveal signs of infection in  urine   Chest x-ray is unremarkable. Rectal temperature was 98 2 today. No other source of infection. Patient remains well-appearing         Amount and/or Complexity of Data Reviewed  Clinical lab tests: ordered and reviewed  Tests in the radiology section of CPT®: ordered and reviewed  Tests in the medicine section of CPT®: ordered and reviewed  Obtain history from someone other than the patient: yes  Review and summarize past medical records: yes  Independent visualization of images, tracings, or specimens: yes    Risk of Complications, Morbidity, and/or Mortality  Presenting problems: high  Diagnostic procedures: high  Management options: moderate  General comments: Elements of this note have been dictated via voice recognition software. Text and phrases may be limited by the accuracy of the software. The chart has been reviewed, but errors may still be present.       Patient Progress  Patient progress: improved    ED Course       Procedures

## 2017-09-27 NOTE — Clinical Note
You must finish all the prescribed antibiotics. THERE SHOULD BE NO LEFT OVER PILLS!! 
Call your doctor/follow up doctor to set up appointment for recheck visit Return to ER for any worsening symptoms or new problems which may arise Increase fluid inta ke

## 2017-09-27 NOTE — ED TRIAGE NOTES
Pt in from [facility]. Per ems pt had fever temperature of 101.9 at facility. Wbc 1.9 on morning lab work. Pt denies complaints.  Denies pain or n/v/d.

## 2017-09-27 NOTE — ED NOTES
I have reviewed discharge instructions with the spouse. The spouse verbalized understanding. Patient left ED via Discharge stretcher to Nursing Home with [facility] ambulance. Opportunity for questions and clarification provided. Patient given 1 scripts.

## 2017-09-27 NOTE — DISCHARGE INSTRUCTIONS

## 2017-09-27 NOTE — ED NOTES
Pt. States he does not know why he is here;according to [facility] pt.was febrile and had a WBC count of 1.9 and wanted the pt.to be evaluated.

## 2017-09-28 NOTE — PROGRESS NOTES
Wife with concerns when patient was here the yesterday regarding payment for ambulance rides to the MD office from [facility] and I told her I would check on it. Call to [facility] and spoke to [facility] in medical records who states the $38 that they had to pay is a reduced rate they have worked out with [employer] to transport to office visits. States Medicare does not cover that and that wife can try to file with BC/BS but no guarantees that they will pay either. Call to wife and relayed this information. She verbalizes understanding and was appreciative.

## 2017-10-02 LAB
BACTERIA SPEC CULT: NORMAL
BACTERIA SPEC CULT: NORMAL
SERVICE CMNT-IMP: NORMAL
SERVICE CMNT-IMP: NORMAL

## 2018-01-31 NOTE — PROGRESS NOTES
Problem: Mobility Impaired (Adult and Pediatric)  Goal: *Acute Goals and Plan of Care (Insert Text)  STG:  (1.)Mr. Vania Barber will perform bed mobility with MODERATE ASSIST within 3 day(s). (2.)Mr. Vania Barber will transfer from bed to chair and chair to bed with MODERATE ASSIST using the least restrictive device within 3 day(s). (3.)Mr. Vania Barber will ambulate with MODERATE ASSIST for 10+ feet with the least restrictive device within 3 day(s). (4.)Mr. Vania Barber will tolerate 15+ minutes of therapeutic activity/exercise while maintaining stable vitals to improve functional strength and endurance within 3 day(s). LTG:  (1.)Mr. Vania Barber will perform bed mobility with MINIMAL ASSIST within 7 day(s). (2.)Mr. Vania Barber will transfer from bed to chair and chair to bed with MINIMAL ASSIST using the least restrictive device within 7 day(s). (3.)Mr. Vania Barber will ambulate with MINIMAL ASSIST for 25+ feet with the least restrictive device within 7 day(s). (4.)Mr. Vania Barber will tolerate 25+ minutes of therapeutic activity/exercise while maintaining stable vitals to improve functional strength and endurance within 7 day(s).   ________________________________________________________________________________________________      PHYSICAL THERAPY: Daily Note, Treatment Day: 3rd and PM    8/19/2017  INPATIENT: Hospital Day: 6  [place] / [place] / Product Type: Medicare /       Jannie POLK     NAME/AGE/GENDER: Lola Reed is a 80 y.o. male        PRIMARY DIAGNOSIS: Intertrochanteric fracture of right femur, closed, initial encounter ([facility] Bean Rubin <principal problem not specified> <principal problem not specified>  Procedure(s) (LRB):  RIGHT FEMUR INSERTION INTRA MEDULLARY NAIL (Right)  4 Days Post-Op  ICD-10: Treatment Diagnosis:       · Generalized Muscle Weakness (M62.81)  · Difficulty in walking, Not elsewhere classified (R26.2)  · History of falling (Z91.81)   Precaution/Allergies:  Review of patient's allergies indicates no known allergies. ASSESSMENT:      Mr. Clayborne Boast is a 80year old male s/p right femoral IM nailing and is WBAT R LE. Patient lives with his spouse in a single story residence with steps to enter. At baseline, patient has a history significant for Parkinson's Disease as well as being legally blind and hard of hearing per chart. Mr. Clayborne Boast endorses frequent falls at home, requires assistance with ADLs from spouse, and ambulates household distances with a SPC. Pt was supine and agreeable to get OOB with PT. Pt performed supine to sit with max assist.  Pt with posterior lean in sitting and required increased time and cues to correct. Pt required max assist x 2 to stand and transfer to the chair. Pt is leaning posteriorly today than when seen by this therapist on Thursday. He is also much less talkative and slower to respond to cues. Pt was left up in chair with needs in reach. No progress with mobility today. Will continue with PT efforts. PM- pt sitting up in chair and wanting to get back to bed. Pt stood x 3 with max assist x 2 and continued to lean posteriorly. Pt stood again and transferred to bed with max assist x 2. Pt returned supine with max assist x 2 and then performed R LE exercises as below. Pt was left with needs in reach supine in bed. Continue with POC. This section established at most recent assessment   PROBLEM LIST (Impairments causing functional limitations):  1. Decreased Strength  2. Decreased ADL/Functional Activities  3. Decreased Transfer Abilities  4. Decreased Ambulation Ability/Technique  5. Decreased Balance  6. Increased Pain  7. Decreased Activity Tolerance  8. Increased Shortness of Breath  9. Decreased Flexibility/Joint Mobility  10. Decreased Knowledge of Precautions  11. Decreased [place] with Home Exercise Program  12.  Decreased Cognition    INTERVENTIONS PLANNED: (Benefits and precautions of physical therapy have been discussed with the patient.)  1. Balance Exercise  2. Bed Mobility  3. Family Education  4. Gait Training  5. Home Exercise Program (HEP)  6. Range of Motion (ROM)  7. Therapeutic Activites  8. Ultrasound (US)  9. Group Therapy      TREATMENT PLAN: Frequency/Duration: twice daily for duration of hospital stay  Rehabilitation Potential For Stated Goals: Jazmyn Grewal REHABILITATION/EQUIPMENT: (at time of discharge pending progress): Continue Skilled Therapy. HISTORY:   History of Present Injury/Illness (Reason for Referral):  S/p right femoral IM nailing  Past Medical History/Comorbidities:   Mr. Casey Erazo  has a past medical history of Alcohol abuse; AVM (arteriovenous malformation) of colon; Diabetes ([address] Hypertension; Liver disease; Macular degeneration; Other ill-defined conditions; Portal hypertension ([address] Psychiatric disorder; PUD (peptic ulcer disease); Thrombocytopenia ([address] and Varices, esophageal ([address] Mr. Casey Erazo  has a past surgical history that includes orthopaedic (1999) and other surgical.  Social History/Living Environment:   Home Environment: Private residence  One/Two Story Residence: One story  Living Alone: No  Support Systems: Spouse/Significant Other/Partner  Patient Expects to be Discharged to[de-identified] Unknown  Current DME Used/Available at Home: Cane, straight, Walker  Tub or Shower Type: Shower  Prior Level of Function/Work/Activity:   Patient lives with his spouse in a single story residence with steps to enter. At baseline, patient has a history significant for Parkinson's Disease with frequent falls, requiring assistance with ADLs from spouse, and ambulates household distances with a SPC.    Number of Personal Factors/Comorbidities that affect the Plan of Care: 1-2: MODERATE COMPLEXITY   EXAMINATION:   Most Recent Physical Functioning:   Gross Assessment:                  Posture:     Balance:  Sitting - Static: Fair (occasional)  Sitting - Dynamic: Fair (occasional)  Standing - Static: Poor  Standing - Dynamic : Poor Bed Mobility:  Supine to Sit: Maximum assistance  Sit to Supine: Maximum assistance;Assist x2  Scooting: Maximum assistance  Wheelchair Mobility:     Transfers:  Sit to Stand: Maximum assistance;Assist x2  Stand to Sit: Maximum assistance;Assist x2  Stand Pivot Transfers: Maximum assistance  Bed to Chair: Assist x2  Gait:             Body Structures Involved:  1. Bones  2. Joints  3. Muscles  4. Ligaments Body Functions Affected:  1. Neuromusculoskeletal  2. Movement Related Activities and Participation Affected:  1. Mobility  2. Self Care  3. Domestic Life   Number of elements that affect the Plan of Care: 4+: HIGH COMPLEXITY   CLINICAL PRESENTATION:   Presentation: Evolving clinical presentation with changing clinical characteristics: MODERATE COMPLEXITY   CLINICAL DECISION MAKIN [facility] Short Form  How much difficulty does the patient currently have. .. Unable A Lot A Little None   1. Turning over in bed (including adjusting bedclothes, sheets and blankets)? [ ] 1   [X] 2   [ ] 3   [ ] 4   2. Sitting down on and standing up from a chair with arms ( e.g., wheelchair, bedside commode, etc.)   [ ] 1   [X] 2   [ ] 3   [ ] 4   3. Moving from lying on back to sitting on the side of the bed? [ ] 1   [X] 2   [ ] 3   [ ] 4   How much help from another person does the patient currently need. .. Total A Lot A Little None   4. Moving to and from a bed to a chair (including a wheelchair)? [X] 1   [ ] 2   [ ] 3   [ ] 4   5. Need to walk in hospital room? [X] 1   [ ] 2   [ ] 3   [ ] 4   6. Climbing 3-5 steps with a railing? [X] 1   [ ] 2   [ ] 3   [ ] 4   © , Trustees of [address] [place] [address] [address] [address], under license to [employer].  All rights reserved    Score:  Initial: 9 Most Recent: 9 (Date: 17 )     Interpretation of Tool:  Represents activities that are increasingly more difficult (i.e. Bed mobility, Transfers, Gait).       Score 24 23 22-20 19-15 14-10 9-7 6       Modifier CH CI CJ CK CL CM CN         · Mobility - Walking and Moving Around:               - CURRENT STATUS:    CM - 80%-99% impaired, limited or restricted               - GOAL STATUS:           CL - 60%-79% impaired, limited or restricted               - D/C STATUS:                       ---------------To be determined---------------  [place] / [place] / Product Type: Medicare /       Medical Necessity:     · Patient demonstrates fair rehab potential due to higher previous functional level. Reason for Services/Other Comments:  · Patient continues to require skilled intervention due to decreased functional mobility and balance/gait status from baseline. .   Use of outcome tool(s) and clinical judgement create a POC that gives a: Questionable prediction of patient's progress: MODERATE COMPLEXITY                 TREATMENT:      Pre-treatment Symptoms/Complaints:    Pain: Initial: Unable     Post Session:       Therapeutic Activity: (    15 Minutes): Therapeutic activities including bed mobility, transfer training, balance training, safety awareness training, and patient education to improve mobility, strength, balance and coordination. Required maximal   to promote static and dynamic balance in standing and promote coordination of bilateral, lower extremity(s). Therapeutic Exercise: (  8 minutes):  Exercises per grid below to improve mobility, strength and coordination. Required minimal visual, verbal and manual cues to promote proper body alignment and promote proper body posture. Progressed range and repetitions as indicated.      Date:  8/17/17 Date:  8/19/17 Date:     ACTIVITY/EXERCISE AM PM AM PM AM PM   Supine ankle pumps X 10 B, AA R   X 10 R AA     Supine heel slides X 10 B, AA R   X 15 R AA     Supine hip abd X 10 B, AA R   X 15 R AA     Supine SAQ X 10 B, AA R   X 10 R AA B = bilateral; AA = active assistive; A = active; P = passive          Braces/Orthotics/Lines/Etc:   · room air  Treatment/Session Assessment:    · Response to Treatment:  See above. · Interdisciplinary Collaboration:  · Physical Therapy Assistant and Registered Nurse  · After treatment position/precautions:  · Supine in bed, Bed alarm/tab alert on, Bed/Chair-wheels locked, Bed in low position, Call light within reach and RN notified  · Compliance with Program/Exercises: Will assess as treatment progresses. · Recommendations/Intent for next treatment session: \"Next visit will focus on advancements to more challenging activities and reduction in assistance provided\".      Total Treatment Duration:PT Patient Time In/Time Out  Time In: 4049  Time Out: 2279 President LUIS DANIEL Guthrie yes

## 2019-02-01 ENCOUNTER — APPOINTMENT (OUTPATIENT)
Dept: GENERAL RADIOLOGY | Age: 84
End: 2019-02-01
Attending: EMERGENCY MEDICINE
Payer: OTHER MISCELLANEOUS

## 2019-02-01 ENCOUNTER — HOSPITAL ENCOUNTER (EMERGENCY)
Age: 84
Discharge: HOME | End: 2019-02-01
Attending: EMERGENCY MEDICINE
Payer: OTHER MISCELLANEOUS

## 2019-02-01 VITALS
OXYGEN SATURATION: 97 % | TEMPERATURE: 98.1 F | SYSTOLIC BLOOD PRESSURE: 106 MMHG | DIASTOLIC BLOOD PRESSURE: 71 MMHG | RESPIRATION RATE: 16 BRPM | HEART RATE: 64 BPM

## 2019-02-01 DIAGNOSIS — S82.891A CLOSED FRACTURE OF RIGHT ANKLE, INITIAL ENCOUNTER: Primary | ICD-10-CM

## 2019-02-01 LAB — GLUCOSE BLD STRIP.AUTO-MCNC: 128 MG/DL (ref 65–100)

## 2019-02-01 PROCEDURE — 99285 EMERGENCY DEPT VISIT HI MDM: CPT | Performed by: EMERGENCY MEDICINE

## 2019-02-01 PROCEDURE — 73610 X-RAY EXAM OF ANKLE: CPT

## 2019-02-01 PROCEDURE — 75810000053 HC SPLINT APPLICATION: Performed by: EMERGENCY MEDICINE

## 2019-02-01 PROCEDURE — 77030008299 HC SPLNT FBRGLS SCTCH 3M -B

## 2019-02-01 PROCEDURE — 77030019945 HC PDNG CST 3M -A

## 2019-02-01 PROCEDURE — 82962 GLUCOSE BLOOD TEST: CPT

## 2019-02-01 RX ORDER — SODIUM CHLORIDE 0.9 % (FLUSH) 0.9 %
5-40 SYRINGE (ML) INJECTION AS NEEDED
Status: DISCONTINUED | OUTPATIENT
Start: 2019-02-01 | End: 2019-02-01

## 2019-02-01 RX ORDER — SODIUM CHLORIDE 0.9 % (FLUSH) 0.9 %
5-40 SYRINGE (ML) INJECTION EVERY 8 HOURS
Status: DISCONTINUED | OUTPATIENT
Start: 2019-02-01 | End: 2019-02-01

## 2019-02-01 NOTE — ED NOTES
[facility] in which the pt resides. Spoke to [address] 3 nurse, Smita Farr and she states pt ambulates independently until Wednesday when the pt began complaining about his left ankle. Nurse states pt is a frequent frantz, but nothing reported to them lately for falls.
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
## 2019-02-01 NOTE — DISCHARGE INSTRUCTIONS
Follow-up with Dr. Juhi Mark at 9:30 AM Monday morning. Return to the emergency department if your symptoms worsen.   You should not bear any weight on your right ankle until you see the orthopedist.

## 2019-02-01 NOTE — ED NOTES
I have reviewed discharge instructions with the caregiver. The caregiver verbalized understanding. Report called to Stacey Hung at [phone] [facility] Patient left ED via Discharge Method: stretcher to Home with [facility] Opportunity for questions and clarification provided. Patient given 0 scripts. To continue your aftercare when you leave the hospital, you may receive an automated call from our care team to check in on how you are doing. This is a free service and part of our promise to provide the best care and service to meet your aftercare needs.  If you have questions, or wish to unsubscribe from this service please call [phone]. Thank you for Choosing our [facility] Emergency Department.

## 2019-02-01 NOTE — ED TRIAGE NOTES
Pt arrives via [facility] from [facility] with right fibular fx. It is unknown when or how it occurred. Pt received 400mg ibuprofen at 0930 at the LT facility and xanax was given as well, but dose not confirmed. VSS in route with decreasing bp 104/66 at this time. Pt appears oriented with EMS, but has dementia and is [place]. Pt has DM. Pt is resting with eyes closed on stretcher on arrival to room. Pt easily to awakened state. Pt states he had a total of 3 falls out of the bed but he doesn't remember when that was

## 2019-02-01 NOTE — ED PROVIDER NOTES
Patient is a poor historian, has dementia. He reportedly fell out [facility] bed at[facility] and had pain and swelling to his right ankle. He had x-rays done there which showed a fracture. He was sent here for management. Elements of this note were made using speech recognition software. As such, errors of speech recognition may occur. Past Medical History:  
Diagnosis Date  Alcohol abuse  AVM (arteriovenous malformation) of colon  Diabetes ([address]  Hypertension  Liver disease Laennec's cirrhosis  Macular degeneration  Other ill-defined conditions(799.89)   
 gout  Portal hypertension ([address]  Psychiatric disorder   
 anxiety/depression  PUD (peptic ulcer disease)  Thrombocytopenia ([address]  Varices, esophageal ([facility] Past Surgical History:  
Procedure Laterality Date  HX ORTHOPAEDIC  1999  
 repair fx right humeral neck with titanium  HX OTHER SURGICAL    
 sx for undescended testicle Family History:  
Problem Relation Age of Onset  Cancer Father \"bone cancer\" Social History Socioeconomic History  Marital status:  Spouse name: Not on file  Number of children: Not on file  Years of education: Not on file  Highest education level: Not on file Social Needs  Financial resource strain: Not on file  Food insecurity - worry: Not on file  Food insecurity - inability: Not on file  Transportation needs - medical: Not on file  Transportation needs - non-medical: Not on file Occupational History  Not on file Tobacco Use  Smoking status: Never Smoker  Smokeless tobacco: Never Used Substance and Sexual Activity  Alcohol use: Yes Comment: quit  4/2010-patients wife states patient was alcoholic  Drug use: No  
 Sexual activity: Not on file Other Topics Concern  Not on file Social History Narrative  Not on file ALLERGIES: Patient has no known allergies. Review of Systems Unable to perform ROS: Dementia Vitals:  
 02/01/19 1309 BP: 103/66 Pulse: 67 Resp: 16 Temp: 97.7 °F (36.5 °C) SpO2: 99% Physical Exam  
Constitutional: He appears well-developed and well-nourished. HENT:  
Head: Normocephalic and atraumatic. Eyes: Conjunctivae are normal. Pupils are equal, round, and reactive to light. Neck: Normal range of motion. Neck supple. Musculoskeletal: He exhibits tenderness. He exhibits no edema. Right ankle wrapped in an Ace wrap. No obvious swelling or deformity noted. Neurological: He is alert. Skin: Skin is warm and dry. Nursing note and vitals reviewed. MDM Number of Diagnoses or Management Options Closed fracture of right ankle, initial encounter: new and requires workup Diagnosis management comments: 2:55 PM PAPERWORK SENT WITH THE PATIENT SHOWS A TRIMALLEOLAR FRACTURe with mild displacement. I have spoken with Dr. Yao Bee about the findings, to get back with me. 
3:11 PM Dr. Yao Bee recommends getting a repeat x-ray on his ankle, he can follow up with Dr. Philippa Holstein at 9:30 AM on Monday. Amount and/or Complexity of Data Reviewed Tests in the radiology section of CPT®: reviewed and ordered Discuss the patient with other providers: yes Risk of Complications, Morbidity, and/or Mortality Presenting problems: moderate Diagnostic procedures: moderate Management options: moderate Patient Progress Patient progress: improved Splint, Ankle Date/Time: 2/1/2019 3:06 PM 
Performed by: Arely Valles MD 
Authorized by: Arely Valles MD  
 
Consent:  
  Consent obtained:  Emergent situation Risks discussed:  Discoloration, numbness, pain and swelling Alternatives discussed:  No treatment and delayed treatment Pre-procedure details:  
  Sensation:  Normal 
Procedure details:  
  Laterality:  Right Location:  Ankle Ankle:  R ankle Splint type:  Short leg and ankle stirrup Supplies:  Ortho-Glass and cotton padding Post-procedure details:  
  Pain:  Unchanged Sensation:  Normal 
  Patient tolerance of procedure: Tolerated well, no immediate complications

## 2019-02-05 ENCOUNTER — ANESTHESIA EVENT (OUTPATIENT)
Dept: SURGERY | Age: 84
End: 2019-02-05
Payer: MEDICARE

## 2019-02-05 NOTE — H&P
Outpatient Surgery History and Physical 
 
 
Arias Bourne was seen and examined. Chief Complaint:   RIGHT ANKLE PAIN Physical Exam: 
 There were no vitals taken for this visit. Heart:   Regular rhythm Lungs:  Are clear History: 
Past Medical History:  
Diagnosis Date  Alcohol abuse  AVM (arteriovenous malformation) of colon  Diabetes ([address]  Hypertension  Liver disease Laennec's cirrhosis  Macular degeneration  Other ill-defined conditions(799.89)   
 gout  Portal hypertension ([address]  Psychiatric disorder   
 anxiety/depression  PUD (peptic ulcer disease)  Thrombocytopenia ([address]  Varices, esophageal ([address] Past Surgical History:  
Procedure Laterality Date  HX ORTHOPAEDIC  1999  
 repair fx right humeral neck with titanium  HX OTHER SURGICAL    
 sx for undescended testicle Family History Problem Relation Age of Onset  Cancer Father \"bone cancer\" Social History Occupational History  Not on file Tobacco Use  Smoking status: Never Smoker  Smokeless tobacco: Never Used Substance and Sexual Activity  Alcohol use: Yes Comment: quit  4/2010-patients wife states patient was alcoholic  Drug use: No  
 Sexual activity: Not on file Allergies: Reviewed per EMR No Known Allergies Medications:   
Prior to Admission medications Medication Sig Start Date End Date Taking? Authorizing Provider  
levoFLOXacin (LEVAQUIN) 750 mg tablet Take 1 Tab by mouth daily. 9/27/17   Melissa Morrow MD  
ALPRAZolam [facility]well) 2 mg tablet Take 1 Tab by mouth two (2) times a day. Max Daily Amount: 4 mg. 8/20/17   Rigo Manzano MD  
tra[facility]ol [facility]) 50 mg tablet Take 1 Tab by mouth every six (6) hours as needed.  Max Daily Amount: 200 mg. 8/20/17   Rigo Manzano MD  
HYDROcodone-acetaminophen [facility]) 5-325 mg per tablet Take 1 Tab by mouth every four (4) hours as needed for Pain. Max Daily Amount: 6 Tabs. 8/20/17   Guillermo Ratliff MD  
ferrous sulfate 325 mg (65 mg iron) cpER Take  by mouth. Michelle Peguero MD  
lactulose (CHRONULAC) 10 gram/15 mL solution Take 10 g by mouth daily. Provider, Historical  
glipiZIDE (GLUCOTROL) 5 mg tablet Take 5 mg by mouth Daily (before breakfast). Provider, Historical  
cyanocobalamin (VITAMIN B-12) 1,000 mcg tablet Take 1,000 mcg by mouth daily. Provider, Historical  
Calcium-Cholecalciferol, D3, (CALCIUM 600 WITH VITAMIN D3) 600 mg(1,500mg) -400 unit cap Take 1 Cap by mouth daily. Indications: HYPOCALCEMIA    Provider, Historical  
OTHER,NON-FORMULARY, Preser-vision takes BID    Provider, Historical  
allopurinol (ZYLOPRIM) 100 mg tablet Take 100 mg by mouth daily. Provider, Historical  
multivitamin (ONE A DAY) tablet Take 1 Tab by mouth daily. Provider, Historical  
metFORMIN (GLUCOPHAGE) 500 mg tablet Take 500 mg by mouth two (2) times daily (with meals). Provider, Historical  
thiamine (B-1) 100 mg tablet Take 100 mg by mouth daily. Provider, Historical  
omeprazole (PRILOSEC) 40 mg capsule Take 40 mg by mouth daily. Provider, Historical  
  
 
The surgery is planned for OPEN REDUCTION INTERNAL FIXATION OF RIGHT ANKLE The patient is here today for outpatient surgery. I have examined the patient, no changes are noted in the patient's medical status. Necessity for the procedure/care is still present and the history and physical above is current. Signed By: Keyon Burrell NP  February 5, 2019 2:24 PM

## 2019-02-06 ENCOUNTER — APPOINTMENT (OUTPATIENT)
Dept: GENERAL RADIOLOGY | Age: 84
End: 2019-02-06
Attending: ORTHOPAEDIC SURGERY
Payer: MEDICARE

## 2019-02-06 ENCOUNTER — ANESTHESIA (OUTPATIENT)
Dept: SURGERY | Age: 84
End: 2019-02-06
Payer: MEDICARE

## 2019-02-06 ENCOUNTER — HOSPITAL ENCOUNTER (OUTPATIENT)
Age: 84
Setting detail: OUTPATIENT SURGERY
Discharge: NURSING, REHAB OR LONG-TERM CARE | End: 2019-02-06
Attending: ORTHOPAEDIC SURGERY | Admitting: ORTHOPAEDIC SURGERY
Payer: MEDICARE

## 2019-02-06 VITALS
DIASTOLIC BLOOD PRESSURE: 50 MMHG | TEMPERATURE: 97.1 F | HEART RATE: 64 BPM | SYSTOLIC BLOOD PRESSURE: 110 MMHG | HEIGHT: 70 IN | BODY MASS INDEX: 20.59 KG/M2 | OXYGEN SATURATION: 100 % | RESPIRATION RATE: 15 BRPM

## 2019-02-06 DIAGNOSIS — S82.891A CLOSED FRACTURE OF RIGHT ANKLE, INITIAL ENCOUNTER: Primary | ICD-10-CM

## 2019-02-06 LAB — GLUCOSE BLD STRIP.AUTO-MCNC: 123 MG/DL (ref 65–100)

## 2019-02-06 PROCEDURE — 77030003602 HC NDL NRV BLK BBMI -B: Performed by: NURSE ANESTHETIST, CERTIFIED REGISTERED

## 2019-02-06 PROCEDURE — 77030000032 HC CUF TRNQT ZIMM -B: Performed by: ORTHOPAEDIC SURGERY

## 2019-02-06 PROCEDURE — 77030029637: Performed by: ORTHOPAEDIC SURGERY

## 2019-02-06 PROCEDURE — C1713 ANCHOR/SCREW BN/BN,TIS/BN: HCPCS | Performed by: ORTHOPAEDIC SURGERY

## 2019-02-06 PROCEDURE — 77030018836 HC SOL IRR NACL ICUM -A: Performed by: ORTHOPAEDIC SURGERY

## 2019-02-06 PROCEDURE — 76010010054 HC POST OP PAIN BLOCK

## 2019-02-06 PROCEDURE — 77030000031 HC BIT DRL QC SYNT -C: Performed by: ORTHOPAEDIC SURGERY

## 2019-02-06 PROCEDURE — 74011250636 HC RX REV CODE- 250/636: Performed by: ANESTHESIOLOGY

## 2019-02-06 PROCEDURE — 73610 X-RAY EXAM OF ANKLE: CPT

## 2019-02-06 PROCEDURE — 76010010054 HC POST OP PAIN BLOCK: Performed by: ORTHOPAEDIC SURGERY

## 2019-02-06 PROCEDURE — 74011250636 HC RX REV CODE- 250/636: Performed by: NURSE PRACTITIONER

## 2019-02-06 PROCEDURE — 77030002933 HC SUT MCRYL J&J -A: Performed by: ORTHOPAEDIC SURGERY

## 2019-02-06 PROCEDURE — 82962 GLUCOSE BLOOD TEST: CPT

## 2019-02-06 PROCEDURE — 77030020782 HC GWN BAIR PAWS FLX 3M -B: Performed by: NURSE ANESTHETIST, CERTIFIED REGISTERED

## 2019-02-06 PROCEDURE — 76210000063 HC OR PH I REC FIRST 0.5 HR: Performed by: ORTHOPAEDIC SURGERY

## 2019-02-06 PROCEDURE — 76210000020 HC REC RM PH II FIRST 0.5 HR: Performed by: ORTHOPAEDIC SURGERY

## 2019-02-06 PROCEDURE — 76010000160 HC OR TIME 0.5 TO 1 HR INTENSV-TIER 1: Performed by: ORTHOPAEDIC SURGERY

## 2019-02-06 PROCEDURE — 77030008467 HC STPLR SKN COVD -B: Performed by: ORTHOPAEDIC SURGERY

## 2019-02-06 PROCEDURE — 77030003862 HC BIT DRL SYNT -B: Performed by: ORTHOPAEDIC SURGERY

## 2019-02-06 PROCEDURE — 74011250636 HC RX REV CODE- 250/636

## 2019-02-06 PROCEDURE — 76060000033 HC ANESTHESIA 1 TO 1.5 HR: Performed by: ORTHOPAEDIC SURGERY

## 2019-02-06 PROCEDURE — 76942 ECHO GUIDE FOR BIOPSY: CPT | Performed by: ORTHOPAEDIC SURGERY

## 2019-02-06 PROCEDURE — 77030021122 HC SPLNT MAT FST BSNM -A: Performed by: ORTHOPAEDIC SURGERY

## 2019-02-06 PROCEDURE — 74011250637 HC RX REV CODE- 250/637: Performed by: ANESTHESIOLOGY

## 2019-02-06 DEVICE — SCREW BNE L16MM DIA2.7MM CORT S STL ST LOK FULL THRD T8: Type: IMPLANTABLE DEVICE | Site: FIBULA | Status: FUNCTIONAL

## 2019-02-06 DEVICE — SCREW BNE L14MM DIA3.5MM CORT S STL ST LOK FULL THRD: Type: IMPLANTABLE DEVICE | Site: FIBULA | Status: FUNCTIONAL

## 2019-02-06 DEVICE — PLATE BNE L125MM 7 H R DST LAT FIBULAR S STL LOK COMPR FOR: Type: IMPLANTABLE DEVICE | Site: FIBULA | Status: FUNCTIONAL

## 2019-02-06 DEVICE — 3.5MM CORTEX SCREW SELF-TAPPING 18MM: Type: IMPLANTABLE DEVICE | Site: FIBULA | Status: FUNCTIONAL

## 2019-02-06 DEVICE — SCREW BNE L12MM DIA2.7MM CORT S STL ST LOK FULL THRD T8: Type: IMPLANTABLE DEVICE | Site: FIBULA | Status: FUNCTIONAL

## 2019-02-06 DEVICE — SCREW BNE L14MM DIA2.7MM CORT S STL ST LOK FULL THRD T8: Type: IMPLANTABLE DEVICE | Site: FIBULA | Status: FUNCTIONAL

## 2019-02-06 RX ORDER — MIDAZOLAM HYDROCHLORIDE 1 MG/ML
1 INJECTION, SOLUTION INTRAMUSCULAR; INTRAVENOUS
Status: DISCONTINUED | OUTPATIENT
Start: 2019-02-06 | End: 2019-02-06 | Stop reason: HOSPADM

## 2019-02-06 RX ORDER — LIDOCAINE HYDROCHLORIDE 10 MG/ML
0.1 INJECTION INFILTRATION; PERINEURAL AS NEEDED
Status: DISCONTINUED | OUTPATIENT
Start: 2019-02-06 | End: 2019-02-06 | Stop reason: HOSPADM

## 2019-02-06 RX ORDER — SODIUM CHLORIDE, SODIUM LACTATE, POTASSIUM CHLORIDE, CALCIUM CHLORIDE 600; 310; 30; 20 MG/100ML; MG/100ML; MG/100ML; MG/100ML
75 INJECTION, SOLUTION INTRAVENOUS CONTINUOUS
Status: DISCONTINUED | OUTPATIENT
Start: 2019-02-06 | End: 2019-02-06 | Stop reason: HOSPADM

## 2019-02-06 RX ORDER — OXYCODONE AND ACETAMINOPHEN 5; 325 MG/1; MG/1
1 TABLET ORAL AS NEEDED
Status: DISCONTINUED | OUTPATIENT
Start: 2019-02-06 | End: 2019-02-06 | Stop reason: HOSPADM

## 2019-02-06 RX ORDER — SODIUM CHLORIDE 0.9 % (FLUSH) 0.9 %
5-40 SYRINGE (ML) INJECTION EVERY 8 HOURS
Status: DISCONTINUED | OUTPATIENT
Start: 2019-02-06 | End: 2019-02-06 | Stop reason: HOSPADM

## 2019-02-06 RX ORDER — PROPOFOL 10 MG/ML
INJECTION, EMULSION INTRAVENOUS AS NEEDED
Status: DISCONTINUED | OUTPATIENT
Start: 2019-02-06 | End: 2019-02-06 | Stop reason: HOSPADM

## 2019-02-06 RX ORDER — PROPOFOL 10 MG/ML
INJECTION, EMULSION INTRAVENOUS
Status: DISCONTINUED | OUTPATIENT
Start: 2019-02-06 | End: 2019-02-06 | Stop reason: HOSPADM

## 2019-02-06 RX ORDER — SODIUM CHLORIDE 0.9 % (FLUSH) 0.9 %
5-40 SYRINGE (ML) INJECTION AS NEEDED
Status: DISCONTINUED | OUTPATIENT
Start: 2019-02-06 | End: 2019-02-06 | Stop reason: HOSPADM

## 2019-02-06 RX ORDER — ROPIVACAINE HYDROCHLORIDE 5 MG/ML
INJECTION, SOLUTION EPIDURAL; INFILTRATION; PERINEURAL
Status: DISCONTINUED | OUTPATIENT
Start: 2019-02-06 | End: 2019-02-06 | Stop reason: HOSPADM

## 2019-02-06 RX ORDER — MIDAZOLAM HYDROCHLORIDE 1 MG/ML
2 INJECTION, SOLUTION INTRAMUSCULAR; INTRAVENOUS
Status: DISCONTINUED | OUTPATIENT
Start: 2019-02-06 | End: 2019-02-06 | Stop reason: HOSPADM

## 2019-02-06 RX ORDER — LIDOCAINE HYDROCHLORIDE 20 MG/ML
INJECTION, SOLUTION EPIDURAL; INFILTRATION; INTRACAUDAL; PERINEURAL AS NEEDED
Status: DISCONTINUED | OUTPATIENT
Start: 2019-02-06 | End: 2019-02-06 | Stop reason: HOSPADM

## 2019-02-06 RX ORDER — NALOXONE HYDROCHLORIDE 0.4 MG/ML
0.2 INJECTION, SOLUTION INTRAMUSCULAR; INTRAVENOUS; SUBCUTANEOUS AS NEEDED
Status: DISCONTINUED | OUTPATIENT
Start: 2019-02-06 | End: 2019-02-06 | Stop reason: HOSPADM

## 2019-02-06 RX ORDER — OXYCODONE HYDROCHLORIDE 5 MG/1
5 TABLET ORAL
Qty: 40 TAB | Refills: 0 | Status: SHIPPED
Start: 2019-02-06

## 2019-02-06 RX ORDER — HYDROMORPHONE HYDROCHLORIDE 2 MG/ML
0.5 INJECTION, SOLUTION INTRAMUSCULAR; INTRAVENOUS; SUBCUTANEOUS
Status: DISCONTINUED | OUTPATIENT
Start: 2019-02-06 | End: 2019-02-06 | Stop reason: SDUPTHER

## 2019-02-06 RX ORDER — SODIUM CHLORIDE, SODIUM LACTATE, POTASSIUM CHLORIDE, CALCIUM CHLORIDE 600; 310; 30; 20 MG/100ML; MG/100ML; MG/100ML; MG/100ML
75 INJECTION, SOLUTION INTRAVENOUS
Status: COMPLETED | OUTPATIENT
Start: 2019-02-06 | End: 2019-02-06

## 2019-02-06 RX ORDER — OXYCODONE AND ACETAMINOPHEN 5; 325 MG/1; MG/1
1 TABLET ORAL AS NEEDED
Status: DISCONTINUED | OUTPATIENT
Start: 2019-02-06 | End: 2019-02-06 | Stop reason: SDUPTHER

## 2019-02-06 RX ORDER — CEFAZOLIN SODIUM/WATER 2 G/20 ML
2 SYRINGE (ML) INTRAVENOUS
Status: COMPLETED | OUTPATIENT
Start: 2019-02-06 | End: 2019-02-06

## 2019-02-06 RX ORDER — ASPIRIN 325 MG
325 TABLET ORAL DAILY
Qty: 30 TAB | Refills: 0 | Status: SHIPPED
Start: 2019-02-06 | End: 2019-03-08

## 2019-02-06 RX ORDER — HYDROMORPHONE HYDROCHLORIDE 2 MG/ML
0.5 INJECTION, SOLUTION INTRAMUSCULAR; INTRAVENOUS; SUBCUTANEOUS
Status: DISCONTINUED | OUTPATIENT
Start: 2019-02-06 | End: 2019-02-06 | Stop reason: HOSPADM

## 2019-02-06 RX ADMIN — OXYCODONE AND ACETAMINOPHEN 1 TABLET: 5; 325 TABLET ORAL at 14:56

## 2019-02-06 RX ADMIN — SODIUM CHLORIDE, SODIUM LACTATE, POTASSIUM CHLORIDE, AND CALCIUM CHLORIDE 75 ML/HR: 600; 310; 30; 20 INJECTION, SOLUTION INTRAVENOUS at 11:25

## 2019-02-06 RX ADMIN — SODIUM CHLORIDE, SODIUM LACTATE, POTASSIUM CHLORIDE, AND CALCIUM CHLORIDE: 600; 310; 30; 20 INJECTION, SOLUTION INTRAVENOUS at 12:40

## 2019-02-06 RX ADMIN — LIDOCAINE HYDROCHLORIDE 40 MG: 20 INJECTION, SOLUTION EPIDURAL; INFILTRATION; INTRACAUDAL; PERINEURAL at 12:47

## 2019-02-06 RX ADMIN — ROPIVACAINE HYDROCHLORIDE 10 ML: 5 INJECTION, SOLUTION EPIDURAL; INFILTRATION; PERINEURAL at 11:45

## 2019-02-06 RX ADMIN — PROPOFOL 100 MCG/KG/MIN: 10 INJECTION, EMULSION INTRAVENOUS at 12:48

## 2019-02-06 RX ADMIN — ROPIVACAINE HYDROCHLORIDE 20 ML: 5 INJECTION, SOLUTION EPIDURAL; INFILTRATION; PERINEURAL at 11:44

## 2019-02-06 RX ADMIN — PROPOFOL 40 MG: 10 INJECTION, EMULSION INTRAVENOUS at 12:47

## 2019-02-06 RX ADMIN — Medication 2 G: at 12:48

## 2019-02-06 NOTE — DISCHARGE INSTRUCTIONS
NON-WEIGHT BEARING RIGHT LEG  ELEVATE RIGHT LEG  KEEP DRESSING CLEAN AND DRY - DO NOT REMOVE  FOLLOW-UP APPT WITH DR Elise Segura - 2/18/19 @ 9:50 AM  PRESCRIPTION FOR OXYCODONE 5 MG GIVEN TO PATIENT  ASPIRIN 325 - 1 TABLET BY MOUTH DAILY X 1 MONTH     After general anesthesia or intravenous sedation, for 24 hours or while taking prescription Narcotics:  · Limit your activities  · Do not drive and operate hazardous machinery  · Do not make important personal or business decisions  · Do  not drink alcoholic beverages  · If you have not urinated within 8 hours after discharge, please contact your surgeon on call. *  Please give a list of your current medications to your Primary Care Provider. *  Please update this list whenever your medications are discontinued, doses are      changed, or new medications (including over-the-counter products) are added. *  Please carry medication information at all times in case of emergency situations. These are general instructions for a healthy lifestyle:  No smoking/ No tobacco products/ Avoid exposure to second hand smoke  Surgeon General's Warning:  Quitting smoking now greatly reduces serious risk to your health. Obesity, smoking, and sedentary lifestyle greatly increases your risk for illness  A healthy diet, regular physical exercise & weight monitoring are important for maintaining a healthy lifestyle    You may be retaining fluid if you have a history of heart failure or if you experience any of the following symptoms:  Weight gain of 3 pounds or more overnight or 5 pounds in a week, increased swelling in our hands or feet or shortness of breath while lying flat in bed. Please call your doctor as soon as you notice any of these symptoms; do not wait until your next office visit.     Recognize signs and symptoms of STROKE:  F-face looks uneven  A-arms unable to move or move unevenly  S-speech slurred or non-existent  T-time-call 911 as soon as signs and symptoms begin-DO NOT go       Back to bed or wait to see if you get better-TIME IS BRAIN.

## 2019-02-06 NOTE — ANESTHESIA PREPROCEDURE EVALUATION
Anesthetic History No history of anesthetic complications Review of Systems / Medical History Patient summary reviewed and pertinent labs reviewed Pulmonary Neuro/Psych Psychiatric history Comments: Anxiety/depression; hx of ETOH abuse until 2010 Cardiovascular Hypertension Exercise tolerance: <4 METS 
  
GI/Hepatic/Renal 
  
 
 
 
PUD and liver disease Comments: ETOH cirrhoses, esophageal varices. Increased INR Endo/Other Diabetes: type 2 Other Findings Physical Exam 
 
Airway Mallampati: II 
TM Distance: > 6 cm Neck ROM: normal range of motion Mouth opening: Normal 
 
 Cardiovascular Rhythm: regular Dental 
 
 
  
Pulmonary Breath sounds clear to auscultation Abdominal 
GI exam deferred Other Findings Anesthetic Plan ASA: 3 Anesthesia type: total IV anesthesia and regional 
 
 
 
 
Induction: Intravenous

## 2019-02-06 NOTE — ANESTHESIA PROCEDURE NOTES
Right Adductor Canal Block Start time: 2/6/2019 11:44 AM 
End time: 2/6/2019 11:45 AM 
Performed by: Micky Louis MD 
Authorized by: Micky Louis MD  
 
 
Pre-procedure: Indications: at surgeon's request and post-op pain management Preanesthetic Checklist: patient identified, risks and benefits discussed, site marked, timeout performed, anesthesia consent given and patient being monitored Timeout Time: 11:44 Preanesthetic Checklist comment:  Time out at Block Type:  
Block Type: Adductor canal 
Laterality:  Right Monitoring:  Responsive to questions, standard ASA monitoring, continuous pulse ox, oxygen, frequent vital sign checks and heart rate Injection Technique:  Single shot Prep: chlorhexidine Location:  Mid thigh Needle Type:  Stimuplex Needle Gauge:  21 G Needle Localization:  Anatomical landmarks and ultrasound guidance Assessment: 
Number of attempts:  1 Injection Assessment:  Incremental injection every 5 mL, no paresthesia, ultrasound image on chart, local visualized surrounding nerve on ultrasound, negative aspiration for blood and no intravascular symptoms Patient tolerance:  Patient tolerated the procedure well with no immediate complications The injection area was scanned before, during, and after the injection and no structural abnormalities were noted

## 2019-02-06 NOTE — PERIOP NOTES
Discharge instructions explained and copy given to wife. [facility] and spoke with Johan Caicedo. SBAR given and discharge instructions explained. Prescription will be transported with discharge instructions to nursing facility. RN stated the pharmacy would be able to fill the RX tomorrow but stated they had pain meds ordered for him currently as well.

## 2019-02-06 NOTE — ANESTHESIA POSTPROCEDURE EVALUATION
Procedure(s): RIGHT BIMALLEOLAR EQUIVALENT ANKLE OPEN REDUCTION INTERNAL FIXATION CHOICE ANES/NURSING HOME PT. Anesthesia Post Evaluation Multimodal analgesia: multimodal analgesia used between 6 hours prior to anesthesia start to PACU discharge Patient location during evaluation: PACU Patient participation: complete - patient participated Level of consciousness: awake and alert Pain management: adequate Airway patency: patent Anesthetic complications: no 
Cardiovascular status: acceptable Respiratory status: acceptable Hydration status: acceptable Post anesthesia nausea and vomiting:  none Visit Vitals /50 Pulse 64 Temp 36.4 °C (97.5 °F) Resp 14 Ht 5' 10\" (1.778 m) SpO2 100% BMI 20.59 kg/m²

## 2019-02-06 NOTE — PERIOP NOTES
Spoke with Kinsey Levy from [facility]. Service will be here in 30-45 minutes to transfer pt back to nursing facility

## 2019-02-06 NOTE — PERIOP NOTES
Pt [facility] to [address] [place] [address] [facility] Tolerated transition to stretch well. Discharge instructions and RX given to EMT.

## 2019-02-06 NOTE — BRIEF OP NOTE
BRIEF OPERATIVE NOTE Date of Procedure: 2/6/2019 Preoperative Diagnosis: Closed bimalleolar fracture of right ankle, initial encounter [I31.550H] Postoperative Diagnosis: Right bimalleolarequivalent ankle fracture Procedure(s): RIGHT BIMALLEOLAR EQUIVALENT ANKLE OPEN REDUCTION INTERNAL FIXATION Surgeon(s) and Role: 
   * Reyna Amador MD - Primary Surgical Assistant: NONE Surgical Staff: 
Circ-1: Elvis Nieto RN Radiology Technician: Zahira Vences RT, R, CT Scrub Tech-1: Will Mott Scrub Tech-2: Lissette Bergeron Scrub Tech-3: Dominik Cutting Event Time In Time Out Incision Start [id number] Incision Close 1339 Anesthesia: General  
Estimated Blood Loss: TOURNQUET Specimens: * No specimens in log * Findings: NONE Complications: NONE Implants: * No implants in log *

## 2019-02-06 NOTE — ANESTHESIA PROCEDURE NOTES
Right Popliteal Block Start time: 2/6/2019 11:39 AM 
End time: 2/6/2019 11:44 AM 
Performed by: Citlaly Pham MD 
Authorized by: Citlaly Pham MD  
 
 
Pre-procedure: Indications: at surgeon's request and post-op pain management Preanesthetic Checklist: patient identified, risks and benefits discussed, site marked, timeout performed, anesthesia consent given and patient being monitored Block Type:  
Block Type:  Popliteal 
Laterality:  Right Monitoring:  Standard ASA monitoring, continuous pulse ox, frequent vital sign checks, heart rate, responsive to questions and oxygen Injection Technique:  Single shot Procedures: ultrasound guided and nerve stimulator Prep: chlorhexidine Location:  Lower thigh Needle Type:  Stimuplex Needle Gauge:  21 G Needle Localization:  Nerve stimulator and ultrasound guidance Motor Response: minimal motor response >0.4 mA Assessment: 
Number of attempts:  1 Injection Assessment:  Incremental injection every 5 mL, no paresthesia, ultrasound image on chart, local visualized surrounding nerve on ultrasound, negative aspiration for blood, no intravascular symptoms and negative aspiration for CSF Patient tolerance:  Patient tolerated the procedure well with no immediate complications The injection area was scanned before, during, and after the local anesthetic injection and no structural abnormalities were noted

## 2019-02-07 NOTE — OP NOTES
[address] [place] [address]  OPERATIVE REPORT    Name:  Stephanie Boucher  MR#:  [id number]  :  07/15/1933  ACCOUNT #:  [de-identified]  DATE OF SERVICE:  2019    PREOPERATIVE DIAGNOSIS:  Right bimalleolar equivalent ankle  fracture. POSTOPERATIVE DIAGNOSIS:  Right bimalleolar equivalent ankle  fracture. PROCEDURE PERFORMED:  ORIF of right bimalleolar equivalent  ankle fracture. SURGEON:  Joseline Beckham MD    ASSISTANT:    ANESTHESIA:  General.    COMPLICATIONS:  None. SPECIMENS REMOVED:    IMPLANTS:    ESTIMATED BLOOD LOSS:  Minimal.    FLUIDS:  See anesthesia record. CLOSURE:  Primary. PROCEDURE:  The patient brought to the operative suite  placed in supine position. After adequate anesthesia was  achieved, the patient had tourniquet applied to the right  thigh over adequate padding and Sof-Rol, placed at the level  of 300 mmHg. The right lower extremity was then prepped and  draped in usual sterile fashion and exsanguinated with  Esmarch. Tourniquet was inflated. Incision was made over  the lateral aspect of the fibula. Hemostasis achieved with  Bovie cautery. Fascia incised along the line of the skin  incision. The peroneal tendons and superficial peroneal  nerves were identified and protected. Fracture was an  oblique fracture running from distal medial to proximal  lateral direction. It was cleaned of soft tissue debris. Decision was made to use the Synthes distal fibular locking  plate, plate was contoured to sit appropriately over the  lateral aspect of the fibula, it was secured distally with  2.4 locking screws. A push-pull screw was then placed  proximally. The push-pull screw with lamina  was  used to regain length and then the fracture was clamped with  a pointed reduction clamp and then the plate was secured  with multiple locking screws proximally and distally.   At  this point, AP, lateral and mortise fluoroscopic images  confirmed the fracture is anatomically reduced, hardware was  in good position. The push-pull screw was removed. Clamps  were removed. Wound was irrigated with normal saline and  closed in layers. Sterile compressive dressing and Olivas  splint with the ankle in supination was applied. Additional  fluoroscopic images after the splint was placed confirmed  that the ankle was reduced anatomically with no widening of  the medial clear space. The patient's tourniquet was then  deflated and he was transferred to recovery room alert and  oriented under the care of anesthesia.           Blanca Aparicio MD      [id number]  D:  02/06/2019 16:03  T:  02/07/2019 2:54  JOB #:  [id number]  CC:  <>

## 2021-07-17 NOTE — ED NOTES
Attempted to call report to nurse and was transferred to nurse supervisor bel.  Called back and gave report to Sonny Connor  
 no

## 2024-05-22 NOTE — PROGRESS NOTES
----- Message from Telma Fulton sent at 5/22/2024  1:51 PM CDT -----  Regarding: Patient advice  Contact: Pt  [phone]        Caller:  Kylah      Returning call to:   Jasvir      Caller can be reached at:   [phone]    Nature of the call:  Returning missed call have a few question regarding message left in portal please call to discuss   Wife gave consent to give info to Max Underwood (close family friend). Home phone: [phone], cell phone: [phone].

## (undated) DEVICE — STERILE SLEEVE: Brand: CONVERTORS

## (undated) DEVICE — SCREW BNE L16MM DIA3.5MM CORT S STL ST NONCANNULATED LOK
Type: IMPLANTABLE DEVICE | Site: FIBULA | Status: NON-FUNCTIONAL
Removed: 2019-02-06

## (undated) DEVICE — SOLUTION IV 1000ML 0.9% SOD CHL

## (undated) DEVICE — SPLINT CAST W5XL30IN GRN STRENGTH PLSTR OF PARIS FAST SET

## (undated) DEVICE — TETRA-FLEX CF WOVEN LATEX FREE ELASTIC BANDAGE 6" X 11 YD: Brand: TETRA-FLEX™CF

## (undated) DEVICE — AMD ANTIMICROBIAL GAUZE SPONGES,12 PLY USP TYPE VII, 0.2% POLYHEXAMETHYLENE BIGUANIDE HCI (PHMB): Brand: CURITY

## (undated) DEVICE — BLADE ASSEMB CLP HAIR FINE --

## (undated) DEVICE — COTTON ROLL,1 LB: Brand: CURITY

## (undated) DEVICE — PADDING CAST W4INXL4YD ST COT COHESIVE HND TEARABLE SPEC

## (undated) DEVICE — 2.0MM DRILL BIT WITH DEPTH MARK/QC/140MM

## (undated) DEVICE — X-LARGE COTTON GLOVE: Brand: DEROYAL

## (undated) DEVICE — Device

## (undated) DEVICE — ROD RMR L950MM DIA2.5MM W/ EXTN BALL TIP

## (undated) DEVICE — SUTURE MCRYL SZ 0 L27IN ABSRB VLT CT-1 L36MM 1/2 CIR TAPR Y340H

## (undated) DEVICE — (D)PREP SKN CHLRAPRP APPL 26ML -- CONVERT TO ITEM 371833

## (undated) DEVICE — SLIM BODY SKIN STAPLER: Brand: APPOSE ULC

## (undated) DEVICE — BUTTON SWITCH PENCIL BLADE ELECTRODE, HOLSTER: Brand: EDGE

## (undated) DEVICE — SUTURE MCRYL SZ 2-0 L27IN ABSRB VLT CT-1 L36MM 1/2 CIR TAPR Y339H

## (undated) DEVICE — REM POLYHESIVE ADULT PATIENT RETURN ELECTRODE: Brand: VALLEYLAB

## (undated) DEVICE — BIT DRL DIA2.8MM SHT CALIB QUIK CPL W/ STP PRO-PAK

## (undated) DEVICE — SURGICAL PROCEDURE PACK BASIC ST FRANCIS

## (undated) DEVICE — 2.5MM DRILL BIT/QC/GOLD/110MM

## (undated) DEVICE — INTENDED FOR TISSUE SEPARATION, AND OTHER PROCEDURES THAT REQUIRE A SHARP SURGICAL BLADE TO PUNCTURE OR CUT.: Brand: BARD-PARKER ® STAINLESS STEEL BLADES

## (undated) DEVICE — 1010 S-DRAPE TOWEL DRAPE 10/BX: Brand: STERI-DRAPE™

## (undated) DEVICE — PADDING CAST W6INXL4YD ST COT COHESIVE HND TEARABLE SPEC

## (undated) DEVICE — 3M™ TEGADERM™ TRANSPARENT FILM DRESSING FRAME STYLE, 1628, 6 IN X 8 IN (15 CM X 20 CM), 10/CT 8CT/CASE: Brand: 3M™ TEGADERM™

## (undated) DEVICE — 3.2MM GUIDE WIRE 400MM

## (undated) DEVICE — DRAPE SHT 3 QTR PROXIMA 53X77 --

## (undated) DEVICE — DRESSING,GAUZE,XEROFORM,CURAD,1"X8",ST: Brand: CURAD

## (undated) DEVICE — (D)DRAPE ISOL ANTIMC 129X100IN -- DISC BY MFR

## (undated) DEVICE — ZIMMER® STERILE DISPOSABLE TOURNIQUET CUFF WITH PLC, DUAL PORT, SINGLE BLADDER, 24 IN. (61 CM)

## (undated) DEVICE — 2000CC GUARDIAN II: Brand: GUARDIAN